# Patient Record
Sex: FEMALE | Race: WHITE | NOT HISPANIC OR LATINO | URBAN - METROPOLITAN AREA
[De-identification: names, ages, dates, MRNs, and addresses within clinical notes are randomized per-mention and may not be internally consistent; named-entity substitution may affect disease eponyms.]

---

## 2017-07-05 ENCOUNTER — OUTPATIENT (OUTPATIENT)
Dept: OUTPATIENT SERVICES | Facility: HOSPITAL | Age: 52
LOS: 1 days | End: 2017-07-05
Payer: COMMERCIAL

## 2017-07-05 ENCOUNTER — APPOINTMENT (OUTPATIENT)
Dept: ULTRASOUND IMAGING | Facility: CLINIC | Age: 52
End: 2017-07-05

## 2017-07-05 DIAGNOSIS — Z98.89 OTHER SPECIFIED POSTPROCEDURAL STATES: Chronic | ICD-10-CM

## 2017-07-05 DIAGNOSIS — Z00.8 ENCOUNTER FOR OTHER GENERAL EXAMINATION: ICD-10-CM

## 2017-07-05 PROCEDURE — 76536 US EXAM OF HEAD AND NECK: CPT | Mod: 26

## 2017-07-05 PROCEDURE — 76536 US EXAM OF HEAD AND NECK: CPT

## 2017-12-28 ENCOUNTER — TRANSCRIPTION ENCOUNTER (OUTPATIENT)
Age: 52
End: 2017-12-28

## 2018-03-20 ENCOUNTER — TRANSCRIPTION ENCOUNTER (OUTPATIENT)
Age: 53
End: 2018-03-20

## 2018-11-06 ENCOUNTER — RESULT REVIEW (OUTPATIENT)
Age: 53
End: 2018-11-06

## 2018-11-09 ENCOUNTER — RESULT REVIEW (OUTPATIENT)
Age: 53
End: 2018-11-09

## 2018-11-28 ENCOUNTER — OUTPATIENT (OUTPATIENT)
Dept: OUTPATIENT SERVICES | Facility: HOSPITAL | Age: 53
LOS: 1 days | Discharge: HOME | End: 2018-11-28
Payer: COMMERCIAL

## 2018-11-28 ENCOUNTER — RESULT REVIEW (OUTPATIENT)
Age: 53
End: 2018-11-28

## 2018-11-28 VITALS
OXYGEN SATURATION: 98 % | DIASTOLIC BLOOD PRESSURE: 68 MMHG | HEART RATE: 84 BPM | RESPIRATION RATE: 16 BRPM | TEMPERATURE: 98 F | SYSTOLIC BLOOD PRESSURE: 139 MMHG

## 2018-11-28 VITALS
TEMPERATURE: 98 F | HEART RATE: 81 BPM | DIASTOLIC BLOOD PRESSURE: 83 MMHG | WEIGHT: 293 LBS | RESPIRATION RATE: 16 BRPM | SYSTOLIC BLOOD PRESSURE: 151 MMHG | HEIGHT: 66 IN | OXYGEN SATURATION: 98 %

## 2018-11-28 DIAGNOSIS — Z98.89 OTHER SPECIFIED POSTPROCEDURAL STATES: Chronic | ICD-10-CM

## 2018-11-28 DIAGNOSIS — Z98.890 OTHER SPECIFIED POSTPROCEDURAL STATES: Chronic | ICD-10-CM

## 2018-11-28 PROCEDURE — 88307 TISSUE EXAM BY PATHOLOGIST: CPT | Mod: 26

## 2018-11-28 PROCEDURE — 88305 TISSUE EXAM BY PATHOLOGIST: CPT | Mod: 26

## 2018-11-28 PROCEDURE — 76098 X-RAY EXAM SURGICAL SPECIMEN: CPT | Mod: 26

## 2018-11-28 RX ORDER — ACETAMINOPHEN 500 MG
1000 TABLET ORAL ONCE
Qty: 0 | Refills: 0 | Status: DISCONTINUED | OUTPATIENT
Start: 2018-11-28 | End: 2018-11-28

## 2018-11-28 RX ORDER — SODIUM CHLORIDE 9 MG/ML
1000 INJECTION, SOLUTION INTRAVENOUS
Qty: 0 | Refills: 0 | Status: DISCONTINUED | OUTPATIENT
Start: 2018-11-28 | End: 2018-11-28

## 2018-11-28 RX ORDER — MEPERIDINE HYDROCHLORIDE 50 MG/ML
12.5 INJECTION INTRAMUSCULAR; INTRAVENOUS; SUBCUTANEOUS
Qty: 0 | Refills: 0 | Status: DISCONTINUED | OUTPATIENT
Start: 2018-11-28 | End: 2018-11-28

## 2018-11-28 RX ORDER — ONDANSETRON 8 MG/1
4 TABLET, FILM COATED ORAL ONCE
Qty: 0 | Refills: 0 | Status: DISCONTINUED | OUTPATIENT
Start: 2018-11-28 | End: 2018-11-28

## 2018-11-28 RX ORDER — FENTANYL CITRATE 50 UG/ML
50 INJECTION INTRAVENOUS
Qty: 0 | Refills: 0 | Status: DISCONTINUED | OUTPATIENT
Start: 2018-11-28 | End: 2018-11-28

## 2018-11-28 RX ORDER — LEVOTHYROXINE SODIUM 125 MCG
1 TABLET ORAL
Qty: 0 | Refills: 0 | COMMUNITY

## 2018-11-28 RX ORDER — OXYCODONE HYDROCHLORIDE 5 MG/1
5 TABLET ORAL ONCE
Qty: 0 | Refills: 0 | Status: DISCONTINUED | OUTPATIENT
Start: 2018-11-28 | End: 2018-11-28

## 2018-11-28 NOTE — ASU PATIENT PROFILE, ADULT - PSH
delivery NOS  3 c- sections 3/87, ,   H/O prior ablation treatment  left leg  S/P evacuation of subdural hematoma  13  S/P gastric surgery  Lap Band   S/P hysterectomy  11/17/10- FARHAN/BSO- polycystic ovaries  Thyroid cancer  Thyroidectomy  for thyroid cancer

## 2018-11-28 NOTE — ASU DISCHARGE PLAN (ADULT/PEDIATRIC). - MEDICATION SUMMARY - MEDICATIONS TO CHANGE
I will SWITCH the dose or number of times a day I take the medications listed below when I get home from the hospital:    Benicar HCT 12.5 mg-40 mg oral tablet  -- 1 tab(s) by mouth once a day    verapamil 180 mg/24 hours oral capsule, extended release  -- 1 cap(s) by mouth once a day    Synthroid 175 mcg (0.175 mg) oral tablet  -- 1 tab(s) by mouth once a day

## 2018-11-28 NOTE — ASU PATIENT PROFILE, ADULT - PMH
Breast cancer  left  Esophageal dysmotility    GERD (gastroesophageal reflux disease)    Hay fever    Hypertension    Obesity, Class II, BMI 35-39.9    Peripheral venous insufficiency  left leg  Polycystic ovary disease    Sleep apnea  uses c-pap  Subdural hematoma, nontraumatic  2/2013  Thyroid cancer  surgery Decembe 1992

## 2018-11-28 NOTE — BRIEF OPERATIVE NOTE - PROCEDURE
<<-----Click on this checkbox to enter Procedure Breast lumpectomy with sentinel node biopsy  11/28/2018    Active  [id number]

## 2018-11-28 NOTE — BRIEF OPERATIVE NOTE - PRE-OP DX
Malignant neoplasm of central portion of left breast in female, estrogen receptor positive  11/28/2018    Active  Mac White

## 2018-12-04 LAB — SURGICAL PATHOLOGY FINAL REPORT - CH: SIGNIFICANT CHANGE UP

## 2018-12-05 DIAGNOSIS — Z80.3 FAMILY HISTORY OF MALIGNANT NEOPLASM OF BREAST: ICD-10-CM

## 2018-12-05 DIAGNOSIS — N60.12 DIFFUSE CYSTIC MASTOPATHY OF LEFT BREAST: ICD-10-CM

## 2018-12-05 DIAGNOSIS — C50.912 MALIGNANT NEOPLASM OF UNSPECIFIED SITE OF LEFT FEMALE BREAST: ICD-10-CM

## 2018-12-05 DIAGNOSIS — E03.9 HYPOTHYROIDISM, UNSPECIFIED: ICD-10-CM

## 2018-12-05 DIAGNOSIS — C77.3 SECONDARY AND UNSPECIFIED MALIGNANT NEOPLASM OF AXILLA AND UPPER LIMB LYMPH NODES: ICD-10-CM

## 2018-12-05 DIAGNOSIS — I10 ESSENTIAL (PRIMARY) HYPERTENSION: ICD-10-CM

## 2018-12-05 DIAGNOSIS — N60.22 FIBROADENOSIS OF LEFT BREAST: ICD-10-CM

## 2018-12-06 PROBLEM — C50.919 MALIGNANT NEOPLASM OF UNSPECIFIED SITE OF UNSPECIFIED FEMALE BREAST: Chronic | Status: ACTIVE | Noted: 2018-11-28

## 2018-12-06 PROBLEM — I87.2 VENOUS INSUFFICIENCY (CHRONIC) (PERIPHERAL): Chronic | Status: ACTIVE | Noted: 2018-11-28

## 2018-12-10 ENCOUNTER — APPOINTMENT (OUTPATIENT)
Dept: PLASTIC SURGERY | Facility: CLINIC | Age: 53
End: 2018-12-10
Payer: COMMERCIAL

## 2018-12-10 VITALS — HEIGHT: 66 IN | WEIGHT: 290 LBS | BODY MASS INDEX: 46.61 KG/M2

## 2018-12-10 DIAGNOSIS — Z72.3 LACK OF PHYSICAL EXERCISE: ICD-10-CM

## 2018-12-10 DIAGNOSIS — Z85.850 PERSONAL HISTORY OF MALIGNANT NEOPLASM OF THYROID: ICD-10-CM

## 2018-12-10 DIAGNOSIS — Z78.9 OTHER SPECIFIED HEALTH STATUS: ICD-10-CM

## 2018-12-10 DIAGNOSIS — Z80.3 FAMILY HISTORY OF MALIGNANT NEOPLASM OF BREAST: ICD-10-CM

## 2018-12-10 PROCEDURE — 99205 OFFICE O/P NEW HI 60 MIN: CPT

## 2018-12-11 ENCOUNTER — TRANSCRIPTION ENCOUNTER (OUTPATIENT)
Age: 53
End: 2018-12-11

## 2018-12-12 ENCOUNTER — FORM ENCOUNTER (OUTPATIENT)
Age: 53
End: 2018-12-12

## 2018-12-12 ENCOUNTER — APPOINTMENT (OUTPATIENT)
Dept: CT IMAGING | Facility: CLINIC | Age: 53
End: 2018-12-12
Payer: COMMERCIAL

## 2018-12-13 ENCOUNTER — APPOINTMENT (OUTPATIENT)
Dept: CT IMAGING | Facility: CLINIC | Age: 53
End: 2018-12-13

## 2018-12-13 ENCOUNTER — OUTPATIENT (OUTPATIENT)
Dept: OUTPATIENT SERVICES | Facility: HOSPITAL | Age: 53
LOS: 1 days | End: 2018-12-13
Payer: COMMERCIAL

## 2018-12-13 DIAGNOSIS — Z00.8 ENCOUNTER FOR OTHER GENERAL EXAMINATION: ICD-10-CM

## 2018-12-13 DIAGNOSIS — Z98.89 OTHER SPECIFIED POSTPROCEDURAL STATES: Chronic | ICD-10-CM

## 2018-12-13 DIAGNOSIS — Z98.890 OTHER SPECIFIED POSTPROCEDURAL STATES: Chronic | ICD-10-CM

## 2018-12-13 PROCEDURE — 74174 CTA ABD&PLVS W/CONTRAST: CPT

## 2018-12-13 PROCEDURE — 82565 ASSAY OF CREATININE: CPT

## 2018-12-13 PROCEDURE — 74174 CTA ABD&PLVS W/CONTRAST: CPT | Mod: 26

## 2018-12-13 NOTE — H&P ADULT - NSHPPHYSICALEXAM_GEN_ALL_CORE
Vital Signs : BP        HR       RR                 Constitutional: well developed, well nourished, no deformities and no acute distress    Neurological: Alert & Oriented x 3, CRISTOBAL, no focal deficits    HEENT: NC/AT, PERRLA, EOMI,  Neck supple.    Respiratory: CTA B/L, No wheezing/crackles/rhonchi    Cardiovascular: (+) S1 & S2, RRR, No m/r/g    Gastrointestinal: soft, NT, nondistended, (+) BS    Genitourinary: non distended bladder, voiding freely    Extremities: No pedal edema, No clubbing, No cyanosis    Skin:  normal skin color and pigmentation, no skin lesions Constitutional: well developed, well nourished, no deformities and no acute distress    Neurological: Alert & Oriented x 3, CRISTOBAL, no focal deficits    HEENT: NC/AT, PERRLA, EOMI,  Neck supple.    Respiratory: CTA B/L, No wheezing/crackles/rhonchi    Cardiovascular: (+) S1 & S2, RRR, No m/r/g    Gastrointestinal: soft, NT, nondistended, (+) BS    Genitourinary: non distended bladder, voiding freely    Extremities: No pedal edema, No clubbing, No cyanosis    Skin:  normal skin color and pigmentation, no skin lesions

## 2018-12-13 NOTE — H&P ADULT - PROBLEM SELECTOR PLAN 1
Pt is referred for Lt heart cath/possible PCI. Labs & medications are reviewed. Informed consent obtained after discussion of [facility] risks, benefits and alternatives  with patient. Risk discussed included, but not limited to MI, stroke, mortality, major bleeding, arrhythmia, or infection.  An educational material provided. Pt. verbalizes understandings of pre-procedural instructions.

## 2018-12-13 NOTE — H&P ADULT - ASSESSMENT
52 yo F with PMHx of obesity, HTN, HLD, had stress test for  pre breast lumpectomy, which showed (+)AW ischemia.  Pt referred for LHC with possible intervention.

## 2018-12-13 NOTE — H&P ADULT - HISTORY OF PRESENT ILLNESS
54 yo F with PMHx of obesity, HTN, HLD, had stress test for  pre breast lumpectomy, which showed (+)AW ischemia.  Pt referred for LHC with possible intervention.

## 2018-12-13 NOTE — H&P ADULT - NSHPREVIEWOFSYSTEMS_GEN_ALL_CORE
General: Pt denies recent weight loss/fever/chills    Neurological: denies numbness or  sensation loss    Cardiovascular: denies chest pain/palpitations/leg edema    Respiratory and Thorax: denies SOB/cough/wheezing    Gastrointestinal: denies abdominal pain/diarrhea/constipation/bloody stool    Genitourinary: denies urinary frequency/urgency/ dysuria    Musculoskeletal: denies joint pain or swelling, denies restricted motion    Hematologic: denies abnormal bleeding

## 2018-12-13 NOTE — H&P ADULT - FAMILY HISTORY
Father  Still living? No  Family history of aortic aneurysm, Age at diagnosis: Age Unknown     Mother  Still living? Yes, Estimated age: 81-90  Family history of hypertension, Age at diagnosis: Age Unknown  Family history of macular degeneration, Age at diagnosis: Age Unknown     Sibling  Still living? Yes, Estimated age: 61-70  Family history of breast cancer in male, Age at diagnosis: Age Unknown

## 2018-12-14 ENCOUNTER — OUTPATIENT (OUTPATIENT)
Dept: OUTPATIENT SERVICES | Facility: HOSPITAL | Age: 53
LOS: 1 days | Discharge: HOME | End: 2018-12-14

## 2018-12-14 VITALS
DIASTOLIC BLOOD PRESSURE: 60 MMHG | OXYGEN SATURATION: 97 % | HEART RATE: 80 BPM | SYSTOLIC BLOOD PRESSURE: 120 MMHG | RESPIRATION RATE: 16 BRPM

## 2018-12-14 VITALS
HEIGHT: 66 IN | RESPIRATION RATE: 16 BRPM | WEIGHT: 289.91 LBS | OXYGEN SATURATION: 99 % | SYSTOLIC BLOOD PRESSURE: 140 MMHG | DIASTOLIC BLOOD PRESSURE: 93 MMHG | TEMPERATURE: 98 F | HEART RATE: 97 BPM

## 2018-12-14 DIAGNOSIS — R94.39 ABNORMAL RESULT OF OTHER CARDIOVASCULAR FUNCTION STUDY: ICD-10-CM

## 2018-12-14 DIAGNOSIS — Z98.890 OTHER SPECIFIED POSTPROCEDURAL STATES: Chronic | ICD-10-CM

## 2018-12-14 DIAGNOSIS — Z98.89 OTHER SPECIFIED POSTPROCEDURAL STATES: Chronic | ICD-10-CM

## 2018-12-14 LAB
ANION GAP SERPL CALC-SCNC: 7 MMOL/L — SIGNIFICANT CHANGE UP (ref 5–17)
BUN SERPL-MCNC: 13 MG/DL — SIGNIFICANT CHANGE UP (ref 7–23)
CALCIUM SERPL-MCNC: 9.1 MG/DL — SIGNIFICANT CHANGE UP (ref 8.5–10.1)
CHLORIDE SERPL-SCNC: 106 MMOL/L — SIGNIFICANT CHANGE UP (ref 96–108)
CO2 SERPL-SCNC: 28 MMOL/L — SIGNIFICANT CHANGE UP (ref 22–31)
CREAT SERPL-MCNC: 0.95 MG/DL — SIGNIFICANT CHANGE UP (ref 0.5–1.3)
GLUCOSE SERPL-MCNC: 119 MG/DL — HIGH (ref 70–99)
HCT VFR BLD CALC: 43.6 % — SIGNIFICANT CHANGE UP (ref 34.5–45)
HGB BLD-MCNC: 14 G/DL — SIGNIFICANT CHANGE UP (ref 11.5–15.5)
MCHC RBC-ENTMCNC: 26.2 PG — LOW (ref 27–34)
MCHC RBC-ENTMCNC: 32.1 GM/DL — SIGNIFICANT CHANGE UP (ref 32–36)
MCV RBC AUTO: 81.6 FL — SIGNIFICANT CHANGE UP (ref 80–100)
NRBC # BLD: 0 /100 WBCS — SIGNIFICANT CHANGE UP (ref 0–0)
PLATELET # BLD AUTO: 291 K/UL — SIGNIFICANT CHANGE UP (ref 150–400)
POTASSIUM SERPL-MCNC: 3.7 MMOL/L — SIGNIFICANT CHANGE UP (ref 3.5–5.3)
POTASSIUM SERPL-SCNC: 3.7 MMOL/L — SIGNIFICANT CHANGE UP (ref 3.5–5.3)
RBC # BLD: 5.34 M/UL — HIGH (ref 3.8–5.2)
RBC # FLD: 13.9 % — SIGNIFICANT CHANGE UP (ref 10.3–14.5)
SODIUM SERPL-SCNC: 141 MMOL/L — SIGNIFICANT CHANGE UP (ref 135–145)
WBC # BLD: 8.04 K/UL — SIGNIFICANT CHANGE UP (ref 3.8–10.5)
WBC # FLD AUTO: 8.04 K/UL — SIGNIFICANT CHANGE UP (ref 3.8–10.5)

## 2018-12-14 NOTE — PROGRESS NOTE ADULT - SUBJECTIVE AND OBJECTIVE BOX
HPI:  52 yo F with PMHx of obesity, HTN, HLD, had stress test for  pre breast lumpectomy, which showed (+)AW ischemia.  Pt referred for LHC with possible intervention. Now, pt is s/p LHC and revealed normal coronaries.     ROS: denies chest pain/ pressure, SOB or palpitation     Physical exam:   General: awake, no acute distress   HEENT: NCAT, neck supple   CV: RRR, normal S1S2, no murmur/ rub   Pulmonary: clear, no wheezing or rales   GI: +BS, soft, non-tender, non-distended   : voiding freely   Extremities no edema, + pedal pulses   Skin: no rashes or lesion. Rt. femoral access site with perclose; no hematoma or bleeding       #  s/p LHC today: normal coronaries.  - post procedure, outcome and follow up care reviewed with patient/MD  - continue current medications   - discharge home today   - follow up with cardiologist as needed

## 2018-12-17 DIAGNOSIS — I87.2 VENOUS INSUFFICIENCY (CHRONIC) (PERIPHERAL): ICD-10-CM

## 2018-12-17 DIAGNOSIS — E28.2 POLYCYSTIC OVARIAN SYNDROME: ICD-10-CM

## 2018-12-17 DIAGNOSIS — K21.9 GASTRO-ESOPHAGEAL REFLUX DISEASE WITHOUT ESOPHAGITIS: ICD-10-CM

## 2018-12-17 DIAGNOSIS — E78.5 HYPERLIPIDEMIA, UNSPECIFIED: ICD-10-CM

## 2018-12-17 DIAGNOSIS — Z85.850 PERSONAL HISTORY OF MALIGNANT NEOPLASM OF THYROID: ICD-10-CM

## 2018-12-17 DIAGNOSIS — Z85.3 PERSONAL HISTORY OF MALIGNANT NEOPLASM OF BREAST: ICD-10-CM

## 2018-12-17 DIAGNOSIS — R94.39 ABNORMAL RESULT OF OTHER CARDIOVASCULAR FUNCTION STUDY: ICD-10-CM

## 2018-12-17 DIAGNOSIS — E66.9 OBESITY, UNSPECIFIED: ICD-10-CM

## 2018-12-17 DIAGNOSIS — G47.30 SLEEP APNEA, UNSPECIFIED: ICD-10-CM

## 2018-12-17 DIAGNOSIS — Z82.49 FAMILY HISTORY OF ISCHEMIC HEART DISEASE AND OTHER DISEASES OF THE CIRCULATORY SYSTEM: ICD-10-CM

## 2018-12-17 DIAGNOSIS — I20.8 OTHER FORMS OF ANGINA PECTORIS: ICD-10-CM

## 2018-12-17 DIAGNOSIS — Z98.84 BARIATRIC SURGERY STATUS: ICD-10-CM

## 2018-12-17 DIAGNOSIS — I10 ESSENTIAL (PRIMARY) HYPERTENSION: ICD-10-CM

## 2018-12-17 DIAGNOSIS — Z86.73 PERSONAL HISTORY OF TRANSIENT ISCHEMIC ATTACK (TIA), AND CEREBRAL INFARCTION WITHOUT RESIDUAL DEFICITS: ICD-10-CM

## 2018-12-18 ENCOUNTER — OUTPATIENT (OUTPATIENT)
Dept: OUTPATIENT SERVICES | Facility: HOSPITAL | Age: 53
LOS: 1 days | Discharge: HOME | End: 2018-12-18
Payer: COMMERCIAL

## 2018-12-18 ENCOUNTER — RX RENEWAL (OUTPATIENT)
Age: 53
End: 2018-12-18

## 2018-12-18 DIAGNOSIS — Z90.89 ACQUIRED ABSENCE OF OTHER ORGANS: Chronic | ICD-10-CM

## 2018-12-18 DIAGNOSIS — Z98.89 OTHER SPECIFIED POSTPROCEDURAL STATES: Chronic | ICD-10-CM

## 2018-12-18 DIAGNOSIS — Z98.890 OTHER SPECIFIED POSTPROCEDURAL STATES: Chronic | ICD-10-CM

## 2018-12-18 DIAGNOSIS — C50.919 MALIGNANT NEOPLASM OF UNSPECIFIED SITE OF UNSPECIFIED FEMALE BREAST: ICD-10-CM

## 2018-12-18 LAB
ANION GAP SERPL CALC-SCNC: 6 MMOL/L — SIGNIFICANT CHANGE UP (ref 5–17)
APPEARANCE UR: CLEAR — SIGNIFICANT CHANGE UP
BACTERIA # UR AUTO: ABNORMAL
BASOPHILS # BLD AUTO: 0.11 K/UL — SIGNIFICANT CHANGE UP (ref 0–0.2)
BASOPHILS NFR BLD AUTO: 1.4 % — SIGNIFICANT CHANGE UP (ref 0–2)
BILIRUB UR-MCNC: NEGATIVE — SIGNIFICANT CHANGE UP
BLD GP AB SCN SERPL QL: SIGNIFICANT CHANGE UP
BUN SERPL-MCNC: 17 MG/DL — SIGNIFICANT CHANGE UP (ref 7–23)
CALCIUM SERPL-MCNC: 9 MG/DL — SIGNIFICANT CHANGE UP (ref 8.5–10.1)
CHLORIDE SERPL-SCNC: 106 MMOL/L — SIGNIFICANT CHANGE UP (ref 96–108)
CO2 SERPL-SCNC: 29 MMOL/L — SIGNIFICANT CHANGE UP (ref 22–31)
COLOR SPEC: YELLOW — SIGNIFICANT CHANGE UP
CREAT SERPL-MCNC: 0.94 MG/DL — SIGNIFICANT CHANGE UP (ref 0.5–1.3)
DIFF PNL FLD: NEGATIVE — SIGNIFICANT CHANGE UP
EOSINOPHIL # BLD AUTO: 0.22 K/UL — SIGNIFICANT CHANGE UP (ref 0–0.5)
EOSINOPHIL NFR BLD AUTO: 2.9 % — SIGNIFICANT CHANGE UP (ref 0–6)
EPI CELLS # UR: SIGNIFICANT CHANGE UP
GLUCOSE SERPL-MCNC: 108 MG/DL — HIGH (ref 70–99)
GLUCOSE UR QL: NEGATIVE MG/DL — SIGNIFICANT CHANGE UP
HCT VFR BLD CALC: 43.5 % — SIGNIFICANT CHANGE UP (ref 34.5–45)
HGB BLD-MCNC: 14.1 G/DL — SIGNIFICANT CHANGE UP (ref 11.5–15.5)
IMM GRANULOCYTES NFR BLD AUTO: 0.3 % — SIGNIFICANT CHANGE UP (ref 0–1.5)
KETONES UR-MCNC: NEGATIVE — SIGNIFICANT CHANGE UP
LEUKOCYTE ESTERASE UR-ACNC: ABNORMAL
LYMPHOCYTES # BLD AUTO: 2.6 K/UL — SIGNIFICANT CHANGE UP (ref 1–3.3)
LYMPHOCYTES # BLD AUTO: 34.2 % — SIGNIFICANT CHANGE UP (ref 13–44)
MCHC RBC-ENTMCNC: 26.9 PG — LOW (ref 27–34)
MCHC RBC-ENTMCNC: 32.4 GM/DL — SIGNIFICANT CHANGE UP (ref 32–36)
MCV RBC AUTO: 82.9 FL — SIGNIFICANT CHANGE UP (ref 80–100)
MONOCYTES # BLD AUTO: 0.76 K/UL — SIGNIFICANT CHANGE UP (ref 0–0.9)
MONOCYTES NFR BLD AUTO: 10 % — SIGNIFICANT CHANGE UP (ref 2–14)
NEUTROPHILS # BLD AUTO: 3.9 K/UL — SIGNIFICANT CHANGE UP (ref 1.8–7.4)
NEUTROPHILS NFR BLD AUTO: 51.2 % — SIGNIFICANT CHANGE UP (ref 43–77)
NITRITE UR-MCNC: NEGATIVE — SIGNIFICANT CHANGE UP
NRBC # BLD: 0 /100 WBCS — SIGNIFICANT CHANGE UP (ref 0–0)
PH UR: 6.5 — SIGNIFICANT CHANGE UP (ref 5–8)
PLATELET # BLD AUTO: 285 K/UL — SIGNIFICANT CHANGE UP (ref 150–400)
POTASSIUM SERPL-MCNC: 3.9 MMOL/L — SIGNIFICANT CHANGE UP (ref 3.5–5.3)
POTASSIUM SERPL-SCNC: 3.9 MMOL/L — SIGNIFICANT CHANGE UP (ref 3.5–5.3)
PROT UR-MCNC: NEGATIVE MG/DL — SIGNIFICANT CHANGE UP
RBC # BLD: 5.25 M/UL — HIGH (ref 3.8–5.2)
RBC # FLD: 13.8 % — SIGNIFICANT CHANGE UP (ref 10.3–14.5)
RBC CASTS # UR COMP ASSIST: NEGATIVE /HPF — SIGNIFICANT CHANGE UP (ref 0–4)
SODIUM SERPL-SCNC: 141 MMOL/L — SIGNIFICANT CHANGE UP (ref 135–145)
SP GR SPEC: 1.01 — SIGNIFICANT CHANGE UP (ref 1.01–1.02)
TYPE + AB SCN PNL BLD: SIGNIFICANT CHANGE UP
UROBILINOGEN FLD QL: NEGATIVE MG/DL — SIGNIFICANT CHANGE UP
WBC # BLD: 7.61 K/UL — SIGNIFICANT CHANGE UP (ref 3.8–10.5)
WBC # FLD AUTO: 7.61 K/UL — SIGNIFICANT CHANGE UP (ref 3.8–10.5)
WBC UR QL: SIGNIFICANT CHANGE UP

## 2018-12-18 PROCEDURE — 71046 X-RAY EXAM CHEST 2 VIEWS: CPT | Mod: 26

## 2018-12-18 PROCEDURE — 93010 ELECTROCARDIOGRAM REPORT: CPT

## 2018-12-18 RX ORDER — LEVOTHYROXINE SODIUM 125 MCG
1 TABLET ORAL
Qty: 0 | Refills: 0 | COMMUNITY

## 2018-12-18 NOTE — H&P PST ADULT - PSH
delivery NOS  3 c- sections 3/87, ,   H/O prior ablation treatment  left leg  S/P evacuation of subdural hematoma  13 craniotomy  S/P gastric surgery  Lap Band  removal of lap band   S/P hysterectomy  11/17/10- FARHAN/BSO- polycystic ovaries  Thyroid cancer  Thyroidectomy  for thyroid cancer

## 2018-12-18 NOTE — CHART NOTE - NSCHARTNOTEFT_GEN_A_CORE
T F  BP  HR  RR  SpO2  % RA      Plan:  1. Pre-surgical instructions given: NPO post midnight  2. Patient Instructed to take the following medications with sips of water: synthroid verapamil   3.  Labs EKG CXR done as per surgeon request  4. Medical Evaluation with Dr Martin   5. EZ wash instructions given      CAPRINI SCORE [CLOT]    AGE RELATED RISK FACTORS                                                       MOBILITY RELATED FACTORS  [ ] Age 41-60 years                                            (1 Point)                  [ ] Bed rest                                                        (1 Point)  [ ] Age: 61-74 years                                           (2 Points)                 [ ] Plaster cast                                                   (2 Points)  [ ] Age= 75 years                                              (3 Points)                 [ ] Bed bound for more than 72 hours                 (2 Points)    DISEASE RELATED RISK FACTORS                                               GENDER SPECIFIC FACTORS  [ ] Edema in the lower extremities                       (1 Point)                  [ ] Pregnancy                                                     (1 Point)  [ ] Varicose veins                                               (1 Point)                  [ ] Post-partum < 6 weeks                                   (1 Point)             [ ] BMI > 25 Kg/m2                                            (1 Point)                  [ ] Hormonal therapy  or oral contraception          (1 Point)                 [ ] Sepsis (in the previous month)                        (1 Point)                  [ ] History of pregnancy complications                 (1 point)  [ ] Pneumonia or serious lung disease                                               [ ] Unexplained or recurrent                     (1 Point)           (in the previous month)                               (1 Point)  [ ] Abnormal pulmonary function test                     (1 Point)                 SURGERY RELATED RISK FACTORS  [ ] Acute myocardial infarction                              (1 Point)                 [ ]  Section                                             (1 Point)  [ ] Congestive heart failure (in the previous month)  (1 Point)               [ ] Minor surgery                                                  (1 Point)   [ ] Inflammatory bowel disease                             (1 Point)                 [ ] Arthroscopic surgery                                        (2 Points)  [ ] Central venous access                                      (2 Points)                [ ] General surgery lasting more than 45 minutes   (2 Points)       [ ] Stroke (in the previous month)                          (5 Points)               [ ] Elective arthroplasty                                         (5 Points)            ( ) past and present malignancy                             ( 2 points)                                                                                                                                    HEMATOLOGY RELATED FACTORS                                                 TRAUMA RELATED RISK FACTORS  [ ] Prior episodes of VTE                                     (3 Points)                 [ ] Fracture of the hip, pelvis, or leg                       (5 Points)  [ ] Positive family history for VTE                         (3 Points)                 [ ] Acute spinal cord injury (in the previous month)  (5 Points)  [ ] Prothrombin 06452 A                                     (3 Points)                 [ ] Paralysis  (less than 1 month)                             (5 Points)  [ ] Factor V Leiden                                             (3 Points)                  [ ] Multiple Trauma within 1 month                        (5 Points)  [ ] Lupus anticoagulants                                     (3 Points)                                                           [ ] Anticardiolipin antibodies                               (3 Points)                                                       [ ] High homocysteine in the blood                      (3 Points)                                             [ ] Other congenital or acquired thrombophilia      (3 Points)                                                [ ] Heparin induced thrombocytopenia                  (3 Points)                                          Total Score [          ] T 98.1 F  /91  pt anxious  HR 73  RR 16   SpO2 98  % RA    53 year old female presents to PST for bilateral mastectomy with left axillary lymph node dissection with Dr Dixon and YOAV flap with Dr Monet   Plan:  1. Pre-surgical instructions given: NPO post midnight  2. Patient Instructed to take the following medications with sips of water: synthroid verapamil   3.  Labs EKG CXR done as per surgeon request  4. Medical Evaluation with Dr Martin   5. EZ wash instructions given      CAPRINI SCORE [CLOT]    AGE RELATED RISK FACTORS                                                       MOBILITY RELATED FACTORS  [x ] Age 41-60 years                                            (1 Point)                  [ ] Bed rest                                                        (1 Point)  [ ] Age: 61-74 years                                           (2 Points)                 [ ] Plaster cast                                                   (2 Points)  [ ] Age= 75 years                                              (3 Points)                 [ ] Bed bound for more than 72 hours                 (2 Points)    DISEASE RELATED RISK FACTORS                                               GENDER SPECIFIC FACTORS  [ ] Edema in the lower extremities                       (1 Point)                  [ ] Pregnancy                                                     (1 Point)  [ ] Varicose veins                                               (1 Point)                  [ ] Post-partum < 6 weeks                                   (1 Point)             [ x] BMI > 25 Kg/m2                                            (1 Point)                  [ ] Hormonal therapy  or oral contraception          (1 Point)                 [ ] Sepsis (in the previous month)                        (1 Point)                  [ ] History of pregnancy complications                 (1 point)  [ ] Pneumonia or serious lung disease                                               [ ] Unexplained or recurrent                     (1 Point)           (in the previous month)                               (1 Point)  [ ] Abnormal pulmonary function test                     (1 Point)                 SURGERY RELATED RISK FACTORS  [ ] Acute myocardial infarction                              (1 Point)                 [ ]  Section                                             (1 Point)  [ ] Congestive heart failure (in the previous month)  (1 Point)               [ ] Minor surgery                                                  (1 Point)   [ ] Inflammatory bowel disease                             (1 Point)                 [ ] Arthroscopic surgery                                        (2 Points)  [ ] Central venous access                                      (2 Points)                [x ] General surgery lasting more than 45 minutes   (2 Points)       [ ] Stroke (in the previous month)                          (5 Points)               [ ] Elective arthroplasty                                         (5 Points)            ( x) past and present malignancy                             ( 2 points)                                                                                                                                    HEMATOLOGY RELATED FACTORS                                                 TRAUMA RELATED RISK FACTORS  [ ] Prior episodes of VTE                                     (3 Points)                 [ ] Fracture of the hip, pelvis, or leg                       (5 Points)  [ ] Positive family history for VTE                         (3 Points)                 [ ] Acute spinal cord injury (in the previous month)  (5 Points)  [ ] Prothrombin 22530 A                                     (3 Points)                 [ ] Paralysis  (less than 1 month)                             (5 Points)  [ ] Factor V Leiden                                             (3 Points)                  [ ] Multiple Trauma within 1 month                        (5 Points)  [ ] Lupus anticoagulants                                     (3 Points)                                                           [ ] Anticardiolipin antibodies                               (3 Points)                                                       [ ] High homocysteine in the blood                      (3 Points)                                             [ ] Other congenital or acquired thrombophilia      (3 Points)                                                [ ] Heparin induced thrombocytopenia                  (3 Points)                                          Total Score [    6      ]      The Caprini score indicates that this patient is at high risk for a VTE event (score => 6).    Surgical patients in this group will benefit from both pharmacologic prophylaxis and intermittent compression devices.    The surgical team will determine the balance between VTE risk and bleeding risk, and other clinical considerations.

## 2018-12-18 NOTE — H&P PST ADULT - PMH
Breast cancer  left  Esophageal dysmotility  due to to lap band  GERD (gastroesophageal reflux disease)    Hay fever    Hypertension    Obesity, Class II, BMI 35-39.9    Peripheral venous insufficiency  left leg  Polycystic ovary disease    Sleep apnea  no cpap  Subdural hematoma, nontraumatic  2/2013  Thyroid cancer  surgery Decembe 1992

## 2018-12-19 ENCOUNTER — RESULT REVIEW (OUTPATIENT)
Age: 53
End: 2018-12-19

## 2018-12-19 ENCOUNTER — INPATIENT (INPATIENT)
Facility: HOSPITAL | Age: 53
LOS: 1 days | Discharge: HOME | End: 2018-12-21
Attending: SURGERY | Admitting: SURGERY
Payer: COMMERCIAL

## 2018-12-19 VITALS
WEIGHT: 293 LBS | DIASTOLIC BLOOD PRESSURE: 57 MMHG | SYSTOLIC BLOOD PRESSURE: 139 MMHG | OXYGEN SATURATION: 97 % | TEMPERATURE: 98 F | HEIGHT: 66 IN | RESPIRATION RATE: 16 BRPM | HEART RATE: 86 BPM

## 2018-12-19 DIAGNOSIS — Z98.89 OTHER SPECIFIED POSTPROCEDURAL STATES: Chronic | ICD-10-CM

## 2018-12-19 DIAGNOSIS — Z98.890 OTHER SPECIFIED POSTPROCEDURAL STATES: Chronic | ICD-10-CM

## 2018-12-19 DIAGNOSIS — Z90.89 ACQUIRED ABSENCE OF OTHER ORGANS: Chronic | ICD-10-CM

## 2018-12-19 PROCEDURE — 88305 TISSUE EXAM BY PATHOLOGIST: CPT | Mod: 26

## 2018-12-19 PROCEDURE — 15877 SUCTION LIPECTOMY TRUNK: CPT

## 2018-12-19 PROCEDURE — 88307 TISSUE EXAM BY PATHOLOGIST: CPT | Mod: 26

## 2018-12-19 PROCEDURE — 38530 BIOPSY/REMOVAL LYMPH NODES: CPT | Mod: LT

## 2018-12-19 PROCEDURE — 38530 BIOPSY/REMOVAL LYMPH NODES: CPT | Mod: RT

## 2018-12-19 PROCEDURE — S2068: CPT | Mod: 62,LT

## 2018-12-19 PROCEDURE — 38999 UNLISTD PX HEMIC/LYMPHTC SYS: CPT

## 2018-12-19 PROCEDURE — 88360 TUMOR IMMUNOHISTOCHEM/MANUAL: CPT | Mod: 26

## 2018-12-19 PROCEDURE — 19307 MAST MOD RAD: CPT | Mod: AS,LT

## 2018-12-19 PROCEDURE — S2068: CPT | Mod: RT,62

## 2018-12-19 PROCEDURE — 19303 MAST SIMPLE COMPLETE: CPT | Mod: AS,LT,XE

## 2018-12-19 PROCEDURE — 88342 IMHCHEM/IMCYTCHM 1ST ANTB: CPT | Mod: 26,59

## 2018-12-19 RX ORDER — INFLUENZA VIRUS VACCINE 15; 15; 15; 15 UG/.5ML; UG/.5ML; UG/.5ML; UG/.5ML
0.5 SUSPENSION INTRAMUSCULAR ONCE
Qty: 0 | Refills: 0 | Status: COMPLETED | OUTPATIENT
Start: 2018-12-19 | End: 2018-12-19

## 2018-12-19 RX ORDER — SODIUM CHLORIDE 9 MG/ML
1000 INJECTION, SOLUTION INTRAVENOUS
Qty: 0 | Refills: 0 | Status: DISCONTINUED | OUTPATIENT
Start: 2018-12-19 | End: 2018-12-20

## 2018-12-19 RX ORDER — ACETAMINOPHEN 500 MG
975 TABLET ORAL ONCE
Qty: 0 | Refills: 0 | Status: COMPLETED | OUTPATIENT
Start: 2018-12-19 | End: 2018-12-19

## 2018-12-19 RX ORDER — OXYCODONE HYDROCHLORIDE 5 MG/1
5 TABLET ORAL EVERY 6 HOURS
Qty: 0 | Refills: 0 | Status: DISCONTINUED | OUTPATIENT
Start: 2018-12-19 | End: 2018-12-21

## 2018-12-19 RX ORDER — LEVOTHYROXINE SODIUM 125 MCG
150 TABLET ORAL DAILY
Qty: 0 | Refills: 0 | Status: DISCONTINUED | OUTPATIENT
Start: 2018-12-19 | End: 2018-12-21

## 2018-12-19 RX ORDER — KETOROLAC TROMETHAMINE 30 MG/ML
15 SYRINGE (ML) INJECTION EVERY 6 HOURS
Qty: 0 | Refills: 0 | Status: DISCONTINUED | OUTPATIENT
Start: 2018-12-19 | End: 2018-12-21

## 2018-12-19 RX ORDER — ACETAMINOPHEN 500 MG
1000 TABLET ORAL ONCE
Qty: 0 | Refills: 0 | Status: COMPLETED | OUTPATIENT
Start: 2018-12-20 | End: 2018-12-20

## 2018-12-19 RX ORDER — VERAPAMIL HCL 240 MG
180 CAPSULE, EXTENDED RELEASE PELLETS 24 HR ORAL DAILY
Qty: 0 | Refills: 0 | Status: DISCONTINUED | OUTPATIENT
Start: 2018-12-19 | End: 2018-12-21

## 2018-12-19 RX ORDER — ONDANSETRON 8 MG/1
4 TABLET, FILM COATED ORAL EVERY 6 HOURS
Qty: 0 | Refills: 0 | Status: DISCONTINUED | OUTPATIENT
Start: 2018-12-19 | End: 2018-12-21

## 2018-12-19 RX ORDER — CEFAZOLIN SODIUM 1 G
2000 VIAL (EA) INJECTION EVERY 8 HOURS
Qty: 0 | Refills: 0 | Status: COMPLETED | OUTPATIENT
Start: 2018-12-19 | End: 2018-12-20

## 2018-12-19 RX ORDER — ACETAMINOPHEN 500 MG
1000 TABLET ORAL ONCE
Qty: 0 | Refills: 0 | Status: COMPLETED | OUTPATIENT
Start: 2018-12-19 | End: 2018-12-20

## 2018-12-19 RX ORDER — PHENTERMINE AND TOPIRAMATE 3.75; 23 MG/1; MG/1
1 CAPSULE, EXTENDED RELEASE ORAL
Qty: 0 | Refills: 0 | COMMUNITY

## 2018-12-19 RX ORDER — OXYCODONE HYDROCHLORIDE 5 MG/1
10 TABLET ORAL ONCE
Qty: 0 | Refills: 0 | Status: DISCONTINUED | OUTPATIENT
Start: 2018-12-19 | End: 2018-12-19

## 2018-12-19 RX ORDER — SODIUM CHLORIDE 9 MG/ML
3 INJECTION INTRAMUSCULAR; INTRAVENOUS; SUBCUTANEOUS EVERY 8 HOURS
Qty: 0 | Refills: 0 | Status: DISCONTINUED | OUTPATIENT
Start: 2018-12-19 | End: 2018-12-21

## 2018-12-19 RX ORDER — ONDANSETRON 8 MG/1
4 TABLET, FILM COATED ORAL ONCE
Qty: 0 | Refills: 0 | Status: DISCONTINUED | OUTPATIENT
Start: 2018-12-19 | End: 2018-12-20

## 2018-12-19 RX ORDER — SODIUM CHLORIDE 9 MG/ML
1000 INJECTION INTRAMUSCULAR; INTRAVENOUS; SUBCUTANEOUS
Qty: 0 | Refills: 0 | Status: DISCONTINUED | OUTPATIENT
Start: 2018-12-19 | End: 2018-12-20

## 2018-12-19 RX ORDER — LOSARTAN POTASSIUM 100 MG/1
100 TABLET, FILM COATED ORAL DAILY
Qty: 0 | Refills: 0 | Status: DISCONTINUED | OUTPATIENT
Start: 2018-12-19 | End: 2018-12-21

## 2018-12-19 RX ORDER — FENTANYL CITRATE 50 UG/ML
50 INJECTION INTRAVENOUS
Qty: 0 | Refills: 0 | Status: DISCONTINUED | OUTPATIENT
Start: 2018-12-19 | End: 2018-12-20

## 2018-12-19 RX ORDER — HEPARIN SODIUM 5000 [USP'U]/ML
5000 INJECTION INTRAVENOUS; SUBCUTANEOUS EVERY 12 HOURS
Qty: 0 | Refills: 0 | Status: DISCONTINUED | OUTPATIENT
Start: 2018-12-19 | End: 2018-12-21

## 2018-12-19 RX ADMIN — SODIUM CHLORIDE 3 MILLILITER(S): 9 INJECTION INTRAMUSCULAR; INTRAVENOUS; SUBCUTANEOUS at 22:00

## 2018-12-19 RX ADMIN — FENTANYL CITRATE 50 MICROGRAM(S): 50 INJECTION INTRAVENOUS at 22:27

## 2018-12-19 RX ADMIN — Medication 975 MILLIGRAM(S): at 08:17

## 2018-12-19 RX ADMIN — OXYCODONE HYDROCHLORIDE 10 MILLIGRAM(S): 5 TABLET ORAL at 08:17

## 2018-12-19 RX ADMIN — OXYCODONE HYDROCHLORIDE 10 MILLIGRAM(S): 5 TABLET ORAL at 08:18

## 2018-12-19 RX ADMIN — SODIUM CHLORIDE 125 MILLILITER(S): 9 INJECTION, SOLUTION INTRAVENOUS at 22:24

## 2018-12-19 RX ADMIN — Medication 100 MILLIGRAM(S): at 22:23

## 2018-12-19 RX ADMIN — FENTANYL CITRATE 50 MICROGRAM(S): 50 INJECTION INTRAVENOUS at 23:00

## 2018-12-20 LAB
ANION GAP SERPL CALC-SCNC: 6 MMOL/L — SIGNIFICANT CHANGE UP (ref 5–17)
BUN SERPL-MCNC: 12 MG/DL — SIGNIFICANT CHANGE UP (ref 7–23)
CALCIUM SERPL-MCNC: 8.2 MG/DL — LOW (ref 8.5–10.1)
CHLORIDE SERPL-SCNC: 109 MMOL/L — HIGH (ref 96–108)
CO2 SERPL-SCNC: 27 MMOL/L — SIGNIFICANT CHANGE UP (ref 22–31)
CREAT SERPL-MCNC: 0.77 MG/DL — SIGNIFICANT CHANGE UP (ref 0.5–1.3)
GLUCOSE SERPL-MCNC: 122 MG/DL — HIGH (ref 70–99)
HCT VFR BLD CALC: 35.2 % — SIGNIFICANT CHANGE UP (ref 34.5–45)
HGB BLD-MCNC: 11.1 G/DL — LOW (ref 11.5–15.5)
MCHC RBC-ENTMCNC: 26.5 PG — LOW (ref 27–34)
MCHC RBC-ENTMCNC: 31.5 GM/DL — LOW (ref 32–36)
MCV RBC AUTO: 84 FL — SIGNIFICANT CHANGE UP (ref 80–100)
NRBC # BLD: 0 /100 WBCS — SIGNIFICANT CHANGE UP (ref 0–0)
PLATELET # BLD AUTO: 278 K/UL — SIGNIFICANT CHANGE UP (ref 150–400)
POTASSIUM SERPL-MCNC: 4.2 MMOL/L — SIGNIFICANT CHANGE UP (ref 3.5–5.3)
POTASSIUM SERPL-SCNC: 4.2 MMOL/L — SIGNIFICANT CHANGE UP (ref 3.5–5.3)
RBC # BLD: 4.19 M/UL — SIGNIFICANT CHANGE UP (ref 3.8–5.2)
RBC # FLD: 14.4 % — SIGNIFICANT CHANGE UP (ref 10.3–14.5)
SODIUM SERPL-SCNC: 142 MMOL/L — SIGNIFICANT CHANGE UP (ref 135–145)
WBC # BLD: 12.95 K/UL — HIGH (ref 3.8–10.5)
WBC # FLD AUTO: 12.95 K/UL — HIGH (ref 3.8–10.5)

## 2018-12-20 RX ORDER — ACETAMINOPHEN 500 MG
650 TABLET ORAL EVERY 6 HOURS
Qty: 0 | Refills: 0 | Status: DISCONTINUED | OUTPATIENT
Start: 2018-12-20 | End: 2018-12-21

## 2018-12-20 RX ADMIN — Medication 15 MILLIGRAM(S): at 01:12

## 2018-12-20 RX ADMIN — Medication 1000 MILLIGRAM(S): at 01:12

## 2018-12-20 RX ADMIN — Medication 100 MILLIGRAM(S): at 06:03

## 2018-12-20 RX ADMIN — Medication 400 MILLIGRAM(S): at 00:42

## 2018-12-20 RX ADMIN — HEPARIN SODIUM 5000 UNIT(S): 5000 INJECTION INTRAVENOUS; SUBCUTANEOUS at 17:25

## 2018-12-20 RX ADMIN — Medication 650 MILLIGRAM(S): at 18:00

## 2018-12-20 RX ADMIN — Medication 15 MILLIGRAM(S): at 17:28

## 2018-12-20 RX ADMIN — Medication 15 MILLIGRAM(S): at 18:00

## 2018-12-20 RX ADMIN — FENTANYL CITRATE 50 MICROGRAM(S): 50 INJECTION INTRAVENOUS at 02:30

## 2018-12-20 RX ADMIN — Medication 15 MILLIGRAM(S): at 06:03

## 2018-12-20 RX ADMIN — Medication 15 MILLIGRAM(S): at 12:35

## 2018-12-20 RX ADMIN — HEPARIN SODIUM 5000 UNIT(S): 5000 INJECTION INTRAVENOUS; SUBCUTANEOUS at 06:04

## 2018-12-20 RX ADMIN — SODIUM CHLORIDE 3 MILLILITER(S): 9 INJECTION INTRAMUSCULAR; INTRAVENOUS; SUBCUTANEOUS at 13:12

## 2018-12-20 RX ADMIN — Medication 650 MILLIGRAM(S): at 17:27

## 2018-12-20 RX ADMIN — Medication 150 MICROGRAM(S): at 10:19

## 2018-12-20 RX ADMIN — Medication 400 MILLIGRAM(S): at 09:30

## 2018-12-20 RX ADMIN — LOSARTAN POTASSIUM 100 MILLIGRAM(S): 100 TABLET, FILM COATED ORAL at 17:36

## 2018-12-20 RX ADMIN — Medication 15 MILLIGRAM(S): at 00:43

## 2018-12-20 RX ADMIN — FENTANYL CITRATE 50 MICROGRAM(S): 50 INJECTION INTRAVENOUS at 02:02

## 2018-12-20 RX ADMIN — Medication 180 MILLIGRAM(S): at 17:37

## 2018-12-20 RX ADMIN — SODIUM CHLORIDE 3 MILLILITER(S): 9 INJECTION INTRAMUSCULAR; INTRAVENOUS; SUBCUTANEOUS at 06:02

## 2018-12-20 NOTE — PROGRESS NOTE ADULT - SUBJECTIVE AND OBJECTIVE BOX
pod 1  comfortable  lungs clear  flaps viable   abdomen benign  ext 1 edema    stable exam  s/p bilateral mastectomy with YOAV reconstruction

## 2018-12-21 ENCOUNTER — TRANSCRIPTION ENCOUNTER (OUTPATIENT)
Age: 53
End: 2018-12-21

## 2018-12-21 VITALS — SYSTOLIC BLOOD PRESSURE: 109 MMHG | HEART RATE: 85 BPM | DIASTOLIC BLOOD PRESSURE: 54 MMHG

## 2018-12-21 PROBLEM — E88.81 METABOLIC SYNDROME AND OTHER INSULIN RESISTANCE: Chronic | Status: ACTIVE | Noted: 2018-12-18

## 2018-12-21 PROBLEM — Z86.39 PERSONAL HISTORY OF OTHER ENDOCRINE, NUTRITIONAL AND METABOLIC DISEASE: Chronic | Status: ACTIVE | Noted: 2018-12-18

## 2018-12-21 RX ADMIN — Medication 15 MILLIGRAM(S): at 07:46

## 2018-12-21 RX ADMIN — Medication 15 MILLIGRAM(S): at 11:22

## 2018-12-21 RX ADMIN — Medication 650 MILLIGRAM(S): at 00:40

## 2018-12-21 RX ADMIN — Medication 650 MILLIGRAM(S): at 07:46

## 2018-12-21 RX ADMIN — Medication 650 MILLIGRAM(S): at 11:22

## 2018-12-21 RX ADMIN — Medication 650 MILLIGRAM(S): at 05:05

## 2018-12-21 RX ADMIN — Medication 650 MILLIGRAM(S): at 01:20

## 2018-12-21 RX ADMIN — Medication 15 MILLIGRAM(S): at 12:25

## 2018-12-21 RX ADMIN — Medication 150 MICROGRAM(S): at 05:04

## 2018-12-21 RX ADMIN — SODIUM CHLORIDE 3 MILLILITER(S): 9 INJECTION INTRAMUSCULAR; INTRAVENOUS; SUBCUTANEOUS at 00:41

## 2018-12-21 RX ADMIN — Medication 180 MILLIGRAM(S): at 05:04

## 2018-12-21 RX ADMIN — SODIUM CHLORIDE 3 MILLILITER(S): 9 INJECTION INTRAMUSCULAR; INTRAVENOUS; SUBCUTANEOUS at 05:05

## 2018-12-21 RX ADMIN — Medication 15 MILLIGRAM(S): at 05:04

## 2018-12-21 RX ADMIN — Medication 650 MILLIGRAM(S): at 12:25

## 2018-12-21 RX ADMIN — LOSARTAN POTASSIUM 100 MILLIGRAM(S): 100 TABLET, FILM COATED ORAL at 05:04

## 2018-12-21 RX ADMIN — HEPARIN SODIUM 5000 UNIT(S): 5000 INJECTION INTRAVENOUS; SUBCUTANEOUS at 05:05

## 2018-12-21 RX ADMIN — Medication 15 MILLIGRAM(S): at 00:41

## 2018-12-21 RX ADMIN — Medication 15 MILLIGRAM(S): at 01:00

## 2018-12-21 NOTE — PROGRESS NOTE ADULT - SUBJECTIVE AND OBJECTIVE BOX
Without complaints. Ambulated, tolerating po, voiding, pain controlled.  vss/a  JPs mod serosang output  flaps viable  mastectomy skin viable with slight ecchymosis  abdomen cdi  umbilicus viable    Doing well  Reviewed home instructions, JPs, bathing, sleeping, activity restrictions, meds and f/u.    Patient to call dr. greco's office today to confirm f/u appt for next week.

## 2018-12-21 NOTE — DISCHARGE NOTE ADULT - PLAN OF CARE
wound healing, drain care, recovery s/o mastectomies and breast reconstruction Follow-up in Dr. Monet's office next week for post-op appointment, possible drain removal

## 2018-12-21 NOTE — DISCHARGE NOTE ADULT - CARE PLAN
Principal Discharge DX:	Malignant neoplasm of female breast, unspecified estrogen receptor status, unspecified laterality, unspecified site of breast  Goal:	wound healing, drain care, recovery s/o mastectomies and breast reconstruction  Assessment and plan of treatment:	Follow-up in Dr. Monet's office next week for post-op appointment, possible drain removal

## 2018-12-21 NOTE — DISCHARGE NOTE ADULT - PATIENT PORTAL LINK FT
You can access the [facility] Patient Portal, offered by [facility], by registering with the following website: [email or web address]

## 2018-12-21 NOTE — DISCHARGE NOTE ADULT - REASON FOR ADMISSION
bilateral mastectomy, left axillary dissection, LYMPHA, bilateral breast reconstruction with YOAV flaps

## 2018-12-21 NOTE — DISCHARGE NOTE ADULT - CARE PROVIDER_API CALL
Guero Monet; SYDNEY), Otolaryngology; Plastic Surgery  [address]  [address]  [place] [address]  Phone: [phone]  Fax: [phone]

## 2018-12-21 NOTE — DISCHARGE NOTE ADULT - MEDICATION SUMMARY - MEDICATIONS TO TAKE
I will START or STAY ON the medications listed below when I get home from the hospital:    verapamil 180 mg/24 hours oral capsule, extended release  -- 1 cap(s) by mouth once a day  -- Indication: For Home medication    Benicar HCT 40 mg-12.5 mg oral tablet  -- 1 tab(s) by mouth once a day  -- Indication: For Home medication    levothyroxine 150 mcg (0.15 mg) oral tablet  -- 1 tab(s) by mouth once a day  -- Indication: For Home medication

## 2018-12-21 NOTE — DISCHARGE NOTE ADULT - ADDITIONAL INSTRUCTIONS
Empty and record drain output twice daily.  Refer to discharge/post-op instructions provided by Dr. Monet's office.

## 2018-12-21 NOTE — DISCHARGE NOTE ADULT - HOSPITAL COURSE
Patient underwent bilateral mastectomies and bilateral breast reconstruction with YOAV flaps in the OR. The patient tolerated the procedure well. Postoperatively the patient was sent to the PACU. The patient remained in the PACU overnight. The patient's flaps were monitored by [employer] and by clinical examination. On POD 1, the patient was hemodynamically stable; was transferred to a surgical floor; was advanced to a regular diet; was placed on her home medications; was out of bed to a chair, and Lester catheter was removed. On POD 2, the patient's Vioptix monitors were removed, and the patient ambulated. During the patient's hospital course, the patient's pain was controlled by IV pain medications and then by PO pain medications.    At the time of discharge, the patient was hemodynamically stable, was tolerating PO diet, was voiding urine, was ambulating, and was comfortable with adequate pain control. The patient was instructed to follow up with Dr. Monet within 1 week after discharge from the hospital. The patient felt comfortable with discharge. The patient received prescriptions for oral pain medication preoperatively from the attending surgeon.

## 2018-12-24 ENCOUNTER — APPOINTMENT (OUTPATIENT)
Dept: PLASTIC SURGERY | Facility: CLINIC | Age: 53
End: 2018-12-24
Payer: COMMERCIAL

## 2018-12-24 PROCEDURE — 99024 POSTOP FOLLOW-UP VISIT: CPT

## 2018-12-27 ENCOUNTER — APPOINTMENT (OUTPATIENT)
Dept: PLASTIC SURGERY | Facility: CLINIC | Age: 53
End: 2018-12-27
Payer: COMMERCIAL

## 2018-12-27 PROCEDURE — 99024 POSTOP FOLLOW-UP VISIT: CPT

## 2018-12-28 DIAGNOSIS — C50.912 MALIGNANT NEOPLASM OF UNSPECIFIED SITE OF LEFT FEMALE BREAST: ICD-10-CM

## 2018-12-28 DIAGNOSIS — E66.01 MORBID (SEVERE) OBESITY DUE TO EXCESS CALORIES: ICD-10-CM

## 2018-12-28 DIAGNOSIS — E03.9 HYPOTHYROIDISM, UNSPECIFIED: ICD-10-CM

## 2018-12-28 DIAGNOSIS — I10 ESSENTIAL (PRIMARY) HYPERTENSION: ICD-10-CM

## 2018-12-28 DIAGNOSIS — E78.5 HYPERLIPIDEMIA, UNSPECIFIED: ICD-10-CM

## 2018-12-28 DIAGNOSIS — Z79.899 OTHER LONG TERM (CURRENT) DRUG THERAPY: ICD-10-CM

## 2018-12-31 ENCOUNTER — APPOINTMENT (OUTPATIENT)
Dept: PLASTIC SURGERY | Facility: CLINIC | Age: 53
End: 2018-12-31
Payer: COMMERCIAL

## 2018-12-31 LAB — SURGICAL PATHOLOGY FINAL REPORT - CH: SIGNIFICANT CHANGE UP

## 2018-12-31 PROCEDURE — 99024 POSTOP FOLLOW-UP VISIT: CPT

## 2019-01-07 ENCOUNTER — APPOINTMENT (OUTPATIENT)
Dept: PLASTIC SURGERY | Facility: CLINIC | Age: 54
End: 2019-01-07
Payer: COMMERCIAL

## 2019-01-07 PROCEDURE — 99024 POSTOP FOLLOW-UP VISIT: CPT

## 2019-01-16 ENCOUNTER — APPOINTMENT (OUTPATIENT)
Dept: PLASTIC SURGERY | Facility: CLINIC | Age: 54
End: 2019-01-16
Payer: COMMERCIAL

## 2019-01-16 PROCEDURE — 99024 POSTOP FOLLOW-UP VISIT: CPT

## 2019-01-22 ENCOUNTER — APPOINTMENT (OUTPATIENT)
Dept: PLASTIC SURGERY | Facility: CLINIC | Age: 54
End: 2019-01-22
Payer: COMMERCIAL

## 2019-01-22 PROCEDURE — 99024 POSTOP FOLLOW-UP VISIT: CPT

## 2019-01-22 NOTE — REASON FOR VISIT
[Post Op: _________] : a [unfilled] post op visit [FreeTextEntry1] : patient is s/p bilateral breast reconstruction with YOAV flaps on 12/19/18.

## 2019-01-24 NOTE — REASON FOR VISIT
[Post Op: _________] : a [unfilled] post op visit [Spouse] : spouse [Other: _____] : [unfilled] [FreeTextEntry1] : DOS: 12/19/2018 s/p bilateral breast reconstruction w YOAV. Patient c/o slight opening at Right abdominal incision and slight oozing.

## 2019-01-25 ENCOUNTER — APPOINTMENT (OUTPATIENT)
Dept: PLASTIC SURGERY | Facility: CLINIC | Age: 54
End: 2019-01-25

## 2019-02-04 ENCOUNTER — APPOINTMENT (OUTPATIENT)
Dept: PLASTIC SURGERY | Facility: CLINIC | Age: 54
End: 2019-02-04
Payer: COMMERCIAL

## 2019-02-04 PROCEDURE — 99024 POSTOP FOLLOW-UP VISIT: CPT

## 2019-02-05 NOTE — REASON FOR VISIT
[FreeTextEntry1] :  DOS: 12/19/2018 s/p bilateral breast reconstruction w YOAV. Patient is getting better with time.

## 2019-02-11 ENCOUNTER — APPOINTMENT (OUTPATIENT)
Dept: PLASTIC SURGERY | Facility: CLINIC | Age: 54
End: 2019-02-11
Payer: COMMERCIAL

## 2019-02-11 PROCEDURE — 99024 POSTOP FOLLOW-UP VISIT: CPT

## 2019-02-19 ENCOUNTER — APPOINTMENT (OUTPATIENT)
Dept: PLASTIC SURGERY | Facility: CLINIC | Age: 54
End: 2019-02-19
Payer: COMMERCIAL

## 2019-02-19 PROCEDURE — 99024 POSTOP FOLLOW-UP VISIT: CPT

## 2019-02-23 NOTE — REASON FOR VISIT
[Follow-Up: _____] : a [unfilled] follow-up visit [FreeTextEntry1] : s/p bilateral breast reconstruction with YOAV flaps 12/19/18

## 2019-02-28 ENCOUNTER — APPOINTMENT (OUTPATIENT)
Dept: PLASTIC SURGERY | Facility: CLINIC | Age: 54
End: 2019-02-28
Payer: COMMERCIAL

## 2019-02-28 DIAGNOSIS — Z42.1 ENCOUNTER FOR BREAST RECONSTRUCTION FOLLOWING MASTECTOMY: ICD-10-CM

## 2019-02-28 PROCEDURE — 99024 POSTOP FOLLOW-UP VISIT: CPT

## 2019-03-02 ENCOUNTER — TRANSCRIPTION ENCOUNTER (OUTPATIENT)
Age: 54
End: 2019-03-02

## 2019-03-03 NOTE — REASON FOR VISIT
[Follow-Up: _____] : a [unfilled] follow-up visit [FreeTextEntry1] : DOS: 12/19/18 s/p Bilateral Breast Reconstruction w/ YOAV Flaps.

## 2019-03-11 ENCOUNTER — APPOINTMENT (OUTPATIENT)
Dept: PLASTIC SURGERY | Facility: CLINIC | Age: 54
End: 2019-03-11
Payer: COMMERCIAL

## 2019-03-11 PROCEDURE — 99024 POSTOP FOLLOW-UP VISIT: CPT

## 2019-03-11 NOTE — REASON FOR VISIT
[Post Op: _________] : a [unfilled] post op visit [Spouse] : spouse [Other: _____] : [unfilled] [FreeTextEntry1] :  DOS: 12/19/18 s/p Bilateral Breast Reconstruction w/ YOAV Flaps. Patient states abdominal wound has improved. Visiting nurse last visit on 3/8/19. Dressing applied.

## 2019-04-08 ENCOUNTER — APPOINTMENT (OUTPATIENT)
Age: 54
End: 2019-04-08
Payer: COMMERCIAL

## 2019-04-08 PROCEDURE — 99214 OFFICE O/P EST MOD 30 MIN: CPT

## 2019-05-20 ENCOUNTER — APPOINTMENT (OUTPATIENT)
Age: 54
End: 2019-05-20
Payer: COMMERCIAL

## 2019-05-20 PROCEDURE — 99212 OFFICE O/P EST SF 10 MIN: CPT

## 2019-05-21 NOTE — REASON FOR VISIT
[Spouse] : spouse [Follow-Up: _____] : a [unfilled] follow-up visit [Other: _____] : [unfilled] [FreeTextEntry1] : DOS: 12/19/18 s/p bilateral breast reconstruction with YOAV flaps.

## 2019-06-11 ENCOUNTER — INPATIENT (INPATIENT)
Facility: HOSPITAL | Age: 54
LOS: 0 days | Discharge: HOME | End: 2019-06-12
Attending: INTERNAL MEDICINE | Admitting: INTERNAL MEDICINE
Payer: COMMERCIAL

## 2019-06-11 VITALS
RESPIRATION RATE: 16 BRPM | DIASTOLIC BLOOD PRESSURE: 71 MMHG | TEMPERATURE: 99 F | HEIGHT: 55 IN | OXYGEN SATURATION: 100 % | SYSTOLIC BLOOD PRESSURE: 121 MMHG | WEIGHT: 293 LBS

## 2019-06-11 DIAGNOSIS — Z98.890 OTHER SPECIFIED POSTPROCEDURAL STATES: Chronic | ICD-10-CM

## 2019-06-11 DIAGNOSIS — I26.99 OTHER PULMONARY EMBOLISM WITHOUT ACUTE COR PULMONALE: ICD-10-CM

## 2019-06-11 DIAGNOSIS — Z98.89 OTHER SPECIFIED POSTPROCEDURAL STATES: Chronic | ICD-10-CM

## 2019-06-11 DIAGNOSIS — I80.229 PHLEBITIS AND THROMBOPHLEBITIS OF UNSPECIFIED POPLITEAL VEIN: ICD-10-CM

## 2019-06-11 DIAGNOSIS — Z90.89 ACQUIRED ABSENCE OF OTHER ORGANS: Chronic | ICD-10-CM

## 2019-06-11 LAB
ALBUMIN SERPL ELPH-MCNC: 3 G/DL — LOW (ref 3.3–5)
ALP SERPL-CCNC: 144 U/L — HIGH (ref 40–120)
ALT FLD-CCNC: 40 U/L — SIGNIFICANT CHANGE UP (ref 12–78)
ANION GAP SERPL CALC-SCNC: 7 MMOL/L — SIGNIFICANT CHANGE UP (ref 5–17)
APTT BLD: 101.1 SEC — HIGH (ref 27.5–36.3)
APTT BLD: 18.2 SEC — LOW (ref 27.5–36.3)
AST SERPL-CCNC: 34 U/L — SIGNIFICANT CHANGE UP (ref 15–37)
BILIRUB SERPL-MCNC: 0.9 MG/DL — SIGNIFICANT CHANGE UP (ref 0.2–1.2)
BUN SERPL-MCNC: 20 MG/DL — SIGNIFICANT CHANGE UP (ref 7–23)
CALCIUM SERPL-MCNC: 8.8 MG/DL — SIGNIFICANT CHANGE UP (ref 8.5–10.1)
CHLORIDE SERPL-SCNC: 105 MMOL/L — SIGNIFICANT CHANGE UP (ref 96–108)
CO2 SERPL-SCNC: 25 MMOL/L — SIGNIFICANT CHANGE UP (ref 22–31)
CREAT SERPL-MCNC: 0.82 MG/DL — SIGNIFICANT CHANGE UP (ref 0.5–1.3)
GLUCOSE SERPL-MCNC: 104 MG/DL — HIGH (ref 70–99)
HCT VFR BLD CALC: 30.9 % — LOW (ref 34.5–45)
HCT VFR BLD CALC: 32 % — LOW (ref 34.5–45)
HGB BLD-MCNC: 10.2 G/DL — LOW (ref 11.5–15.5)
HGB BLD-MCNC: 9.9 G/DL — LOW (ref 11.5–15.5)
INR BLD: 1.03 RATIO — SIGNIFICANT CHANGE UP (ref 0.88–1.16)
INR BLD: 1.13 RATIO — SIGNIFICANT CHANGE UP (ref 0.88–1.16)
MAGNESIUM SERPL-MCNC: 1.9 MG/DL — SIGNIFICANT CHANGE UP (ref 1.6–2.6)
MCHC RBC-ENTMCNC: 26.9 PG — LOW (ref 27–34)
MCHC RBC-ENTMCNC: 27.1 PG — SIGNIFICANT CHANGE UP (ref 27–34)
MCHC RBC-ENTMCNC: 31.9 GM/DL — LOW (ref 32–36)
MCHC RBC-ENTMCNC: 32 GM/DL — SIGNIFICANT CHANGE UP (ref 32–36)
MCV RBC AUTO: 84 FL — SIGNIFICANT CHANGE UP (ref 80–100)
MCV RBC AUTO: 84.9 FL — SIGNIFICANT CHANGE UP (ref 80–100)
NT-PROBNP SERPL-SCNC: 108 PG/ML — SIGNIFICANT CHANGE UP (ref 0–125)
PLATELET # BLD AUTO: 200 K/UL — SIGNIFICANT CHANGE UP (ref 150–400)
PLATELET # BLD AUTO: 227 K/UL — SIGNIFICANT CHANGE UP (ref 150–400)
POTASSIUM SERPL-MCNC: 4.4 MMOL/L — SIGNIFICANT CHANGE UP (ref 3.5–5.3)
POTASSIUM SERPL-SCNC: 4.4 MMOL/L — SIGNIFICANT CHANGE UP (ref 3.5–5.3)
PROT SERPL-MCNC: 6.8 GM/DL — SIGNIFICANT CHANGE UP (ref 6–8.3)
PROTHROM AB SERPL-ACNC: 11.4 SEC — SIGNIFICANT CHANGE UP (ref 10–12.9)
PROTHROM AB SERPL-ACNC: 12.6 SEC — SIGNIFICANT CHANGE UP (ref 10–12.9)
RBC # BLD: 3.68 M/UL — LOW (ref 3.8–5.2)
RBC # BLD: 3.77 M/UL — LOW (ref 3.8–5.2)
RBC # FLD: 19.4 % — HIGH (ref 10.3–14.5)
RBC # FLD: 19.8 % — HIGH (ref 10.3–14.5)
SODIUM SERPL-SCNC: 137 MMOL/L — SIGNIFICANT CHANGE UP (ref 135–145)
TROPONIN I SERPL-MCNC: <0.015 NG/ML — SIGNIFICANT CHANGE UP (ref 0.01–0.04)
TROPONIN I SERPL-MCNC: <0.015 NG/ML — SIGNIFICANT CHANGE UP (ref 0.01–0.04)
WBC # BLD: 10.46 K/UL — SIGNIFICANT CHANGE UP (ref 3.8–10.5)
WBC # BLD: 14.22 K/UL — HIGH (ref 3.8–10.5)
WBC # FLD AUTO: 10.46 K/UL — SIGNIFICANT CHANGE UP (ref 3.8–10.5)
WBC # FLD AUTO: 14.22 K/UL — HIGH (ref 3.8–10.5)

## 2019-06-11 PROCEDURE — 93306 TTE W/DOPPLER COMPLETE: CPT | Mod: 26

## 2019-06-11 PROCEDURE — 71275 CT ANGIOGRAPHY CHEST: CPT | Mod: 26

## 2019-06-11 PROCEDURE — 99285 EMERGENCY DEPT VISIT HI MDM: CPT

## 2019-06-11 PROCEDURE — 93971 EXTREMITY STUDY: CPT | Mod: 26,RT

## 2019-06-11 PROCEDURE — 93010 ELECTROCARDIOGRAM REPORT: CPT

## 2019-06-11 RX ORDER — LEVOTHYROXINE SODIUM 125 MCG
1 TABLET ORAL
Qty: 0 | Refills: 0 | DISCHARGE

## 2019-06-11 RX ORDER — LEVOTHYROXINE SODIUM 125 MCG
175 TABLET ORAL DAILY
Refills: 0 | Status: DISCONTINUED | OUTPATIENT
Start: 2019-06-11 | End: 2019-06-12

## 2019-06-11 RX ORDER — HEPARIN SODIUM 5000 [USP'U]/ML
10000 INJECTION INTRAVENOUS; SUBCUTANEOUS ONCE
Refills: 0 | Status: COMPLETED | OUTPATIENT
Start: 2019-06-11 | End: 2019-06-11

## 2019-06-11 RX ORDER — HEPARIN SODIUM 5000 [USP'U]/ML
5000 INJECTION INTRAVENOUS; SUBCUTANEOUS EVERY 6 HOURS
Refills: 0 | Status: DISCONTINUED | OUTPATIENT
Start: 2019-06-11 | End: 2019-06-12

## 2019-06-11 RX ORDER — HEPARIN SODIUM 5000 [USP'U]/ML
INJECTION INTRAVENOUS; SUBCUTANEOUS
Qty: 25000 | Refills: 0 | Status: DISCONTINUED | OUTPATIENT
Start: 2019-06-11 | End: 2019-06-12

## 2019-06-11 RX ORDER — IBUPROFEN 200 MG
400 TABLET ORAL EVERY 6 HOURS
Refills: 0 | Status: DISCONTINUED | OUTPATIENT
Start: 2019-06-11 | End: 2019-06-12

## 2019-06-11 RX ORDER — LORATADINE 10 MG/1
10 TABLET ORAL DAILY
Refills: 0 | Status: DISCONTINUED | OUTPATIENT
Start: 2019-06-11 | End: 2019-06-12

## 2019-06-11 RX ORDER — OLMESARTAN MEDOXOMIL-HYDROCHLOROTHIAZIDE 25; 40 MG/1; MG/1
1 TABLET, FILM COATED ORAL
Qty: 0 | Refills: 0 | DISCHARGE

## 2019-06-11 RX ORDER — VERAPAMIL HCL 240 MG
180 CAPSULE, EXTENDED RELEASE PELLETS 24 HR ORAL DAILY
Refills: 0 | Status: DISCONTINUED | OUTPATIENT
Start: 2019-06-11 | End: 2019-06-12

## 2019-06-11 RX ORDER — SENNA PLUS 8.6 MG/1
2 TABLET ORAL AT BEDTIME
Refills: 0 | Status: DISCONTINUED | OUTPATIENT
Start: 2019-06-11 | End: 2019-06-12

## 2019-06-11 RX ORDER — HEPARIN SODIUM 5000 [USP'U]/ML
10000 INJECTION INTRAVENOUS; SUBCUTANEOUS EVERY 6 HOURS
Refills: 0 | Status: DISCONTINUED | OUTPATIENT
Start: 2019-06-11 | End: 2019-06-12

## 2019-06-11 RX ORDER — ONDANSETRON 8 MG/1
4 TABLET, FILM COATED ORAL EVERY 6 HOURS
Refills: 0 | Status: DISCONTINUED | OUTPATIENT
Start: 2019-06-11 | End: 2019-06-12

## 2019-06-11 RX ADMIN — HEPARIN SODIUM 2100 UNIT(S)/HR: 5000 INJECTION INTRAVENOUS; SUBCUTANEOUS at 19:33

## 2019-06-11 RX ADMIN — HEPARIN SODIUM 2400 UNIT(S)/HR: 5000 INJECTION INTRAVENOUS; SUBCUTANEOUS at 12:16

## 2019-06-11 RX ADMIN — HEPARIN SODIUM 10000 UNIT(S): 5000 INJECTION INTRAVENOUS; SUBCUTANEOUS at 12:15

## 2019-06-11 NOTE — ED PROVIDER NOTE - CARDIAC, MLM
+tachycardic rate, regular rhythm.  Heart sounds S1, S2.  No murmurs, rubs or gallops. Bedside US shows no heart strain.

## 2019-06-11 NOTE — ED ADULT NURSE NOTE - OBJECTIVE STATEMENT
Patient presents to ED complaining of L Leg DVT. Patient complaining of LLE pain starting Sunday, patient was seen for outpatient doppler today, +DVT in LLE. Patient complaining of increased pain with ambulation. Patient denies any CP, SOB, weakness, recent travel, dizziness, fever chills. Patient currently receiving chemo treatment at [facility] for breast CA, last treatment Wednesday. A&0x3. Patient denies hx of DVT, not on anticoagulation

## 2019-06-11 NOTE — CONSULT NOTE ADULT - SUBJECTIVE AND OBJECTIVE BOX
Patient is a 53y old  Female who presents with a chief complaint of L eg pain and sob.     HPI: 53yof with pmh of known breast malignancy presented with c/o SOB and L leg pain.  Pt denies any CP or dizziness.   She was noted to have a popliteal DVT and referred to ER for further evaluation by her cardiologist.       PAST MEDICAL & SURGICAL HISTORY:  Metabolic syndrome  History of mixed hyperlipidemia  Peripheral venous insufficiency: left leg  Breast cancer: left  Polycystic ovary disease  Esophageal dysmotility: due to to lap band  Obesity, Class II, BMI 35-39.9  Subdural hematoma, nontraumatic: 2013  Hay fever  GERD (gastroesophageal reflux disease)  Sleep apnea: no cpap  Thyroid cancer: surgery 1992  Hypertension  History of tonsillectomy  H/O prior ablation treatment: left leg  S/P evacuation of subdural hematoma: 13 craniotomy  S/P hysterectomy: 11/17/10- FARHAN/BSO- polycystic ovaries   delivery NOS: 3 c- sections 3/87, ,   Thyroid cancer: Thyroidectomy  for thyroid cancer      MEDICATIONS  (STANDING):  heparin  Infusion.  Unit(s)/Hr (24 mL/Hr) IV Continuous <Continuous>  heparin  Injectable 61238 Unit(s) IV Push once    MEDICATIONS  (PRN):  heparin  Injectable 86220 Unit(s) IV Push every 6 hours PRN For aPTT less than 40  heparin  Injectable 5000 Unit(s) IV Push every 6 hours PRN For aPTT between 40 - 57      FAMILY HISTORY:  Family history of breast cancer in male (Sibling)  Family history of macular degeneration (Mother)  Family history of hypertension (Mother)  Family history of aortic aneurysm (Father)      SOCIAL HISTORY:  no recent smoking     REVIEW OF SYSTEMS:  CONSTITUTIONAL:    No fatigue, malaise, lethargy.  No fever or chills.  HEENT:  Eyes:  No visual changes.     ENT:  No epistaxis.  No sinus pain.    RESPIRATORY:  No cough.  No wheeze.  No hemoptysis.  c/o shortness of breath.  CARDIOVASCULAR:  No chest pains.  No palpitations. c/o shortness of breath, No orthopnea or PND.  GASTROINTESTINAL:  No abdominal pain.  No nausea or vomiting.    GENITOURINARY:    No hematuria.    MUSCULOSKELETAL:  L leg pain   NEUROLOGICAL:  No tingling or numbness or weakness.  PSYCHIATRIC:  No confusion  SKIN:  No rashes.    ENDOCRINE:  No unexplained weight loss.  No polydipsia.   HEMATOLOGIC:  No anemia.  No prolonged or excessive bleeding.   ALLERGIC AND IMMUNOLOGIC:  No pruritus.          Vital Signs Last 24 Hrs  T(C): 37.2 (2019 11:35), Max: 37.2 (2019 10:03)  T(F): 98.9 (2019 11:35), Max: 99 (2019 10:03)  HR: 107 (2019 11:35) (107 - 113)  BP: 100/63 (2019 11:35) (100/63 - 121/71)  BP(mean): --  RR: 16 (2019 11:35) (16 - 16)  SpO2: 100% (:35) (100% - 100%)    PHYSICAL EXAM-    Constitutional: The patient appears to be normal, well developed, well nourished and alert and oriented to time, place and person. The patient does not appear acutely ill.     Head: Head is normocephalic and atraumatic.      Neck: No jugular venous distention. No audible carotid bruits. There are strong carotid pulses bilaterally. No JVD.     Cardiovascular: Regular rate and rhythm without S3, S4. No murmurs or rubs are appreciated.      Respiratory: Breathsounds are normal. No rales. No wheezing.    Abdomen: Soft, nontender, nondistended with positive bowel sounds.      Extremity: LLE tenderness   Neurologic: The patient is alert and oriented.      Skin: No rash, no obvious lesions noted.      Psychiatric: The patient appears to be emotionally stable.      INTERPRETATION OF TELEMETRY: sinus tachycardia with isolated PVCs     ECG: Sinus tachycardia, normal axis, no st t chnages. isolated PVCs     I&O's Detail      LABS:                        10.2   14.22 )-----------( 200      ( 2019 11:04 )             32.0     06-11    137  |  105  |  20  ----------------------------<  104<H>  4.4   |  25  |  0.82    Ca    8.8      2019 11:04  Mg     1.9     06-11    TPro  6.8  /  Alb  3.0<L>  /  TBili  0.9  /  DBili  x   /  AST  34  /  ALT  40  /  AlkPhos  144<H>  0611    CARDIAC MARKERS ( 2019 11:04 )  <0.015 ng/mL / x     / x     / x     / x              I&O's Summary    BNPSerum Pro-Brain Natriuretic Peptide: 108 pg/mL ( @ 11:04)    RADIOLOGY & ADDITIONAL STUDIES: Patient is a 53y old  Female who presents with a chief complaint of L eg pain and sob.     HPI: 53yof with pmh of known breast malignancy presented with c/o SOB and L leg pain.  Pt denies any CP or dizziness.   She was noted to have a popliteal DVT and referred to ER for further evaluation by her cardiologist.       PAST MEDICAL & SURGICAL HISTORY:  Metabolic syndrome  History of mixed hyperlipidemia  Peripheral venous insufficiency: left leg  Breast cancer: left  Polycystic ovary disease  Esophageal dysmotility: due to to lap band  Obesity, Class II, BMI 35-39.9  Subdural hematoma, nontraumatic: 2013  Hay fever  GERD (gastroesophageal reflux disease)  Sleep apnea: no cpap  Thyroid cancer: surgery 1992  Hypertension  History of tonsillectomy  H/O prior ablation treatment: left leg  S/P evacuation of subdural hematoma: 13 craniotomy  S/P hysterectomy: 11/17/10- FARHAN/BSO- polycystic ovaries   delivery NOS: 3 c- sections 3/87, ,   Thyroid cancer: Thyroidectomy  for thyroid cancer      MEDICATIONS  (STANDING):  heparin  Infusion.  Unit(s)/Hr (24 mL/Hr) IV Continuous <Continuous>  heparin  Injectable 35905 Unit(s) IV Push once    MEDICATIONS  (PRN):  heparin  Injectable 94378 Unit(s) IV Push every 6 hours PRN For aPTT less than 40  heparin  Injectable 5000 Unit(s) IV Push every 6 hours PRN For aPTT between 40 - 57      FAMILY HISTORY:  Family history of breast cancer in male (Sibling)  Family history of macular degeneration (Mother)  Family history of hypertension (Mother)  Family history of aortic aneurysm (Father)      SOCIAL HISTORY:  no recent smoking     REVIEW OF SYSTEMS:  CONSTITUTIONAL:    No fatigue, malaise, lethargy.  No fever or chills.  HEENT:  Eyes:  No visual changes.     ENT:  No epistaxis.  No sinus pain.    RESPIRATORY:  No cough.  No wheeze.  No hemoptysis.  c/o shortness of breath.  CARDIOVASCULAR:  No chest pains.  No palpitations. c/o shortness of breath, No orthopnea or PND.  GASTROINTESTINAL:  No abdominal pain.  No nausea or vomiting.    GENITOURINARY:    No hematuria.    MUSCULOSKELETAL:  L leg pain   NEUROLOGICAL:  No tingling or numbness or weakness.  PSYCHIATRIC:  No confusion  SKIN:  No rashes.    ENDOCRINE:  No unexplained weight loss.  No polydipsia.   HEMATOLOGIC:  No anemia.  No prolonged or excessive bleeding.   ALLERGIC AND IMMUNOLOGIC:  No pruritus.          Vital Signs Last 24 Hrs  T(C): 37.2 (2019 11:35), Max: 37.2 (2019 10:03)  T(F): 98.9 (2019 11:35), Max: 99 (2019 10:03)  HR: 107 (2019 11:35) (107 - 113)  BP: 100/63 (2019 11:35) (100/63 - 121/71)  BP(mean): --  RR: 16 (2019 11:35) (16 - 16)  SpO2: 100% (:35) (100% - 100%)    PHYSICAL EXAM-    Constitutional: The patient appears to be normal, well developed, well nourished and alert and oriented to time, place and person. The patient does not appear acutely ill.     Head: Head is normocephalic and atraumatic.      Neck: No jugular venous distention. No audible carotid bruits. There are strong carotid pulses bilaterally. No JVD.     Cardiovascular: Regular rate and rhythm without S3, S4. No murmurs or rubs are appreciated.      Respiratory: Breathsounds are normal. No rales. No wheezing.    Abdomen: Soft, nontender, nondistended with positive bowel sounds.      Extremity: LLE tenderness   Neurologic: The patient is alert and oriented.      Skin: No rash, no obvious lesions noted.      Psychiatric: The patient appears to be emotionally stable.      INTERPRETATION OF TELEMETRY: sinus tachycardia with isolated PVCs     ECG: Sinus tachycardia, normal axis, no st t chnages. isolated PVCs     I&O's Detail      LABS:                        10.2   14.22 )-----------( 200      ( 2019 11:04 )             32.0     06-11    137  |  105  |  20  ----------------------------<  104<H>  4.4   |  25  |  0.82    Ca    8.8      2019 11:04  Mg     1.9     06-11    TPro  6.8  /  Alb  3.0<L>  /  TBili  0.9  /  DBili  x   /  AST  34  /  ALT  40  /  AlkPhos  144<H>  0611    CARDIAC MARKERS ( 2019 11:04 )  <0.015 ng/mL / x     / x     / x     / x              I&O's Summary    BNPSerum Pro-Brain Natriuretic Peptide: 108 pg/mL ( @ 11:04)    RADIOLOGY & ADDITIONAL STUDIES:  < from: CT Angio Chest PE Protocol w/ IV Cont (19 @ 12:02) >    EXAM:  CTA CHEST PE PROTOCOL (W)AW IC                            PROCEDURE DATE:  2019          INTERPRETATION:  CLINICAL INFORMATION: Left lower extremity deep vein   thrombosis and tachycardia. Rule out pulmonary embolism    COMPARISON: None.    PROCEDURE:   CTA of the Chest was performed with intravenous contrast.  90 ml of Omnipaque 350 was injected intravenously. 10 ml were discarded.  Sagittal and coronal reformats were performed as well as MIP   reconstructions.    FINDINGS:    LOWER NECK: Within normal limits.  AXILLA, MEDIASTINUM AND BENJAMIN: No lymphadenopathy.  VESSELS: Pulmonary embolism involving the right main pulmonary artery   extending into lower lobe segmental and subsegmental branches and also   within subsegmental branches in the left lower lobe. Right-sided central   venous port with tip in the superior vena cava. Multiple surgical clips   in the superior mediastinum.  HEART: The heart is enlarged.  No pericardial effusion. Straightening of   the ventricular septum raising the possibility of right heart strain.  PLEURA: No pleural effusion.  LUNGS AND LARGE AIRWAYS: No pulmonary nodule, mass or consolidation.   Patent central airways.   VISUALIZED UPPER ABDOMEN: 4.2 cm lamellated gallstone. Subcentimeter   indeterminate hypodense lesion in the spleen  BONES: No acute abnormality.  CHEST WALL:  Bilateral mastectomy and breast reconstruction    IMPRESSION:     Moderate amount of pulmonary embolism as detailed above.  Suspicion for right heart strain.    Findings discussed with Elías Leiva and Tamara on 19, 1215 hours.    CHRIS HOOPER   This document has been electronically signed. 2019 12:20PM        < end of copied text >

## 2019-06-11 NOTE — ED ADULT NURSE NOTE - NSIMPLEMENTINTERV_GEN_ALL_ED
Implemented All Fall Risk Interventions:  [place] to call system. Call bell, personal items and telephone within reach. Instruct patient to call for assistance. Room bathroom lighting operational. Non-slip footwear when patient is off stretcher. Physically safe environment: no spills, clutter or unnecessary equipment. Stretcher in lowest position, wheels locked, appropriate side rails in place. Provide visual cue, wrist band, yellow gown, etc. Monitor gait and stability. Monitor for mental status changes and reorient to person, place, and time. Review medications for side effects contributing to fall risk. Reinforce activity limits and safety measures with patient and family.

## 2019-06-11 NOTE — ED PROVIDER NOTE - CLINICAL SUMMARY MEDICAL DECISION MAKING FREE TEXT BOX
53 F with PMHx of breast CA was diagnosed this morning with a DVT behind L knee. Will obtain CT scan r/o PE, IV fluid, labs, Heparin. 53 F with PMHx of breast CA was diagnosed this morning with a DVT behind L knee. Will obtain EKG, CT scan r/o PE, IV fluid, labs, Heparin.

## 2019-06-11 NOTE — ED ADULT NURSE REASSESSMENT NOTE - NS ED NURSE REASSESS COMMENT FT1
Patient comfortable in bed awaiting hospitalist orders and bed placement. Educated patient on admission process, patient verbally understood. Patient denies any SOB, CP, dizziness, no complaints at this time. Will continue to monitor

## 2019-06-11 NOTE — H&P ADULT - NSHPPHYSICALEXAM_GEN_ALL_CORE
Vital Signs Last 24 Hrs  T(C): 37.2 (11 Jun 2019 17:13), Max: 37.2 (11 Jun 2019 10:03)  T(F): 99 (11 Jun 2019 17:13), Max: 99 (11 Jun 2019 10:03)  HR: 115 (11 Jun 2019 17:13) (107 - 115)  BP: 102/56 (11 Jun 2019 17:13) (98/61 - 121/71)  BP(mean): --  RR: 17 (11 Jun 2019 17:13) (16 - 17)  SpO2: 97% (11 Jun 2019 17:13) (97% - 100%)    GEN: lying in bed, NAD  HEENT:   NC/AT  CV:  +S1, +S2, tachy   RESP:   lungs clear to auscultation bilaterally, no wheeze, rales, rhonchi   GI:  abdomen soft, non-tender, non-distended, normoactive BS  MSK:   normal muscle tone  EXT:  LLE larger than RLE + calf tenderness LLE  NEURO:  AAOX3, no focal neurological deficits  SKIN:  abd wall wound noted with dressing in place

## 2019-06-11 NOTE — H&P ADULT - HISTORY OF PRESENT ILLNESS
pt is 54 yo female with PMH of left breast CA undergoing chemo at [place] with last chemo 6 days ago, GERD, HTN, chronic abd wound, HTN, HL here after noticing pain and swelling of LLE and seen in [place] office (Daughter works for Dr. Haro) with LE dopplers positive for DVT in LLE.  pt noted to be SOB and CT pe protocol showed b/l PE wtih stat ECHO done to r/o RV strain whichw as negative as per cardio consult Dr. Leiva.  Pt started on heparin drip and now only c/o LLE pain with ambulation deneis cp, pleuritic chest pain, dizziness.      ROS:   All 10 systems reviewed and found to be negative with the exception of what has been described above.    Allergies and Intolerances:        Allergies:  	No Known Allergies:     Patient History:    Past Medical History:  Breast cancer  left  Esophageal dysmotility    GERD (gastroesophageal reflux disease)    Hay fever    Hypertension    Obesity, Class II, BMI 35-39.9    Peripheral venous insufficiency  left leg  Polycystic ovary disease    Sleep apnea  uses c-pap  Subdural hematoma, nontraumatic  2013  Thyroid cancer  surgery 1992.     Past Surgical History:  breast reconstruction with flap    delivery NOS  3 c- sections 3/87, ,   H/O prior ablation treatment  left leg  S/P evacuation of subdural hematoma  13  S/P gastric surgery  Lap Band   S/P hysterectomy  11/17/10- FARHAN/BSO- polycystic ovaries  Thyroid cancer  Thyroidectomy  for thyroid cancer.     Family History:  Father -  - Family history of aortic aneurysm, Age at diagnosis: Age Unknown     Mother   Still living? Yes, Estimated age: 81-90  Family history of hypertension, Age at diagnosis: Age Unknown  Family history of macular degeneration, Age at diagnosis: Age Unknown      Social History:  Social History -  , lives at home with spouse. no tob/occ etoh/ no drug use

## 2019-06-11 NOTE — ED PROVIDER NOTE - CARE PLAN
Principal Discharge DX:	Acute pulmonary embolism without acute cor pulmonale, unspecified pulmonary embolism type

## 2019-06-11 NOTE — CONSULT NOTE ADULT - ASSESSMENT
SOB and left leg pain - 1. SOB and left leg pain - bilateral pulmonary embolism. Reviewed stat 2 D echocardiogram.  no evidence of RV strain.  Iv heparin to be continued.   Admit to telemetry.    2.Isolated premature ventricular contractions- close monitoring of the electrolytes.  Goal potassium of 4 and magnesium of 2.     3. HTN- continue home meds.     Other medical issues- Management per primary team.   Thank you for allowing me to participate in the care of this patient. Please feel free to contact me with any questions.

## 2019-06-11 NOTE — ED PROVIDER NOTE - SKIN, MLM
Skin normal color for race, warm, dry and intact. No evidence of rash. +port on R chest wall. Port site looks good, no erythema or induration. +bandage across lower abdomen.

## 2019-06-11 NOTE — H&P ADULT - ASSESSMENT
pt is 52 yo female with PMH of left breast CA undergoing chemo at [place] with last chemo 6 days ago, GERD, HTN, chronic abd wound, HTN, HL here after noticing pain and swelling of LLE and seen in [place] office (Daughter works for Dr. Haro) with LE dopplers positive for DVT in LLE. now with b/l PE    # LLE DVT/bilateral PE - cardio input noted and ECHO reviewed with no evidence of RV strain  - admit to tele as per cardio recs  - pt on heparin drip, will continue.  decide on DOAC vs coumadin upon dc, will need to confer with dr. limon her heme/onc  - PVCs noted and will monitor and replete lytes    # HTN/HL - continue home meds with parameters.  normotensive    # breast CA  - continue chemo as outpt with dr limon at [facility]    # abd wound - result of plastic surgery for flap for breast pt gets dressing changed by family and was due for change today which likely explains slight leukocytosis   - wound care consult.      # hypothyroidism - continue synthroid  - check TSH     DVT proph- on heparin drip pt is 54 yo female with PMH of left breast CA undergoing chemo at [place] with last chemo 6 days ago, GERD, HTN, chronic abd wound, HTN, HL here after noticing pain and swelling of LLE and seen in [place] office (Daughter works for Dr. Haro) with LE dopplers positive for DVT in LLE. now with b/l PE    # LLE DVT/bilateral PE - cardio input noted and ECHO reviewed with no evidence of RV strain  - admit to tele as per cardio recs  - pt on heparin drip, will continue.  decide on DOAC vs coumadin upon dc, will need to confer with dr. limon her heme/onc  - PVCs noted and will monitor and replete lytes    # HTN/HL - continue home meds with parameters.  normotensive. pt was takign wrong dose of benicar at home (40mg) recently chagned to candesartan 16-hctz 25 mg which is what she should be discharged on if Bps table for now will just continue verpamapil and restart meds in AM    # breast CA  - continue chemo as outpt with dr limon at [facility]    # abd wound - result of plastic surgery for flap for breast pt gets dressing changed by family and was due for change today which likely explains slight leukocytosis   - wound care consult.    - [facility] solomon Monet    # hypothyroidism - continue synthroid  - check TSH     DVT proph- on heparin drip

## 2019-06-11 NOTE — ED PROVIDER NOTE - PROGRESS NOTE DETAILS
Andres GREENFIELD for ED attending, Dr. Hammonds: MD Leiva heard from radiology, pt has b/l PE's. Ordering bedside echo to r/o right heart strain. Heparin being started at this time.

## 2019-06-11 NOTE — ED PROVIDER NOTE - PMH
Breast cancer  left  Esophageal dysmotility  due to to lap band  GERD (gastroesophageal reflux disease)    Hay fever    History of mixed hyperlipidemia    Hypertension    Metabolic syndrome    Obesity, Class II, BMI 35-39.9    Peripheral venous insufficiency  left leg  Polycystic ovary disease    Sleep apnea  no cpap  Subdural hematoma, nontraumatic  2/2013  Thyroid cancer  surgery Decembe 1992

## 2019-06-11 NOTE — H&P ADULT - NSHPLABSRESULTS_GEN_ALL_CORE
10.2   14.22 )-----------( 200      ( 11 Jun 2019 11:04 )             32.0     06-11    137  |  105  |  20  ----------------------------<  104<H>  4.4   |  25  |  0.82    Ca    8.8      11 Jun 2019 11:04  Mg     1.9     06-11    TPro  6.8  /  Alb  3.0<L>  /  TBili  0.9  /  DBili  x   /  AST  34  /  ALT  40  /  AlkPhos  144<H>  06-11    CARDIAC MARKERS ( 11 Jun 2019 14:44 )  <0.015 ng/mL / x     / x     / x     / x      CARDIAC MARKERS ( 11 Jun 2019 11:04 )  <0.015 ng/mL / x     / x     / x     / x          LIVER FUNCTIONS - ( 11 Jun 2019 11:04 )  Alb: 3.0 g/dL / Pro: 6.8 gm/dL / ALK PHOS: 144 U/L / ALT: 40 U/L / AST: 34 U/L / GGT: x           PT/INR - ( 11 Jun 2019 11:04 )   PT: 11.4 sec;   INR: 1.03 ratio         PTT - ( 11 Jun 2019 11:04 )  PTT:18.2 sec      < from: CT Angio Chest PE Protocol w/ IV Cont (06.11.19 @ 12:02) >    LOWER NECK: Within normal limits.  AXILLA, MEDIASTINUM AND BENJAMIN: No lymphadenopathy.  VESSELS: Pulmonary embolism involving the right main pulmonary artery   extending into lower lobe segmental and subsegmental branches and also   within subsegmental branches in the left lower lobe. Right-sided central   venous port with tip in the superior vena cava. Multiple surgical clips   in the superior mediastinum.  HEART: The heart is enlarged.  No pericardial effusion. Straightening of   the ventricular septum raising the possibility of right heart strain.  PLEURA: No pleural effusion.  LUNGS AND LARGE AIRWAYS: No pulmonary nodule, mass or consolidation.   Patent central airways.   VISUALIZED UPPER ABDOMEN: 4.2 cm lamellated gallstone. Subcentimeter   indeterminate hypodense lesion in the spleen  BONES: No acute abnormality.  CHEST WALL:  Bilateral mastectomy and breast reconstruction    IMPRESSION:     Moderate amount of pulmonary embolism as detailed above.  Suspicion for right heart strain.    < end of copied text >            < end of copied text >    < from: Transthoracic Echocardiogram (06.11.19 @ 12:58) >    The mitral valve leaflets are well seen thin and pliable; preserved   leaflet excursion noted. No mitral valve regurgitation noted.   The aortic valve is well visualized, appears normal. Valve opening seems   to be normal.   No aortic regurgitation is present.   The tricuspid valve leaflets are well seen and appear thin and pliable   with preserved leaflets excursion. No tricuspidregurgitation noted.   The pulmonic valve is not well seen. Trivial pulmonic regurgitation   noted.   Normal appearing left atrium.   Left ventricle systolic function appears preserved based on difficult   transthoracic views; segmental wall motion abnormalities can not be rule   out. Visual estimation of left ventricle ejection fraction is >50%.   Normal appearing right atrium.   Normal appearing right ventricle structure and function.   All visualized extra cardiac structures appears to be normal.   No evidence of pericardial effusion.    < end of copied text >

## 2019-06-11 NOTE — ED ADULT TRIAGE NOTE - CHIEF COMPLAINT QUOTE
Pt c.o left leg pain aisince sunday, seen by MD with positive DVT dx. pt undergoing chemo for breast ca, last treatment wednesday, no fevers. no SOb or dizziness. generalized weakness.

## 2019-06-12 ENCOUNTER — TRANSCRIPTION ENCOUNTER (OUTPATIENT)
Age: 54
End: 2019-06-12

## 2019-06-12 VITALS — WEIGHT: 293 LBS

## 2019-06-12 LAB
ALBUMIN SERPL ELPH-MCNC: 2.9 G/DL — LOW (ref 3.3–5)
ALP SERPL-CCNC: 119 U/L — SIGNIFICANT CHANGE UP (ref 40–120)
ALT FLD-CCNC: 33 U/L — SIGNIFICANT CHANGE UP (ref 12–78)
ANION GAP SERPL CALC-SCNC: 6 MMOL/L — SIGNIFICANT CHANGE UP (ref 5–17)
ANISOCYTOSIS BLD QL: SLIGHT — SIGNIFICANT CHANGE UP
APTT BLD: 72.1 SEC — HIGH (ref 27.5–36.3)
APTT BLD: 74.5 SEC — HIGH (ref 27.5–36.3)
AST SERPL-CCNC: 24 U/L — SIGNIFICANT CHANGE UP (ref 15–37)
BASOPHILS # BLD AUTO: 0.08 K/UL — SIGNIFICANT CHANGE UP (ref 0–0.2)
BASOPHILS NFR BLD AUTO: 1 % — SIGNIFICANT CHANGE UP (ref 0–2)
BILIRUB SERPL-MCNC: 0.8 MG/DL — SIGNIFICANT CHANGE UP (ref 0.2–1.2)
BUN SERPL-MCNC: 15 MG/DL — SIGNIFICANT CHANGE UP (ref 7–23)
CALCIUM SERPL-MCNC: 8.9 MG/DL — SIGNIFICANT CHANGE UP (ref 8.5–10.1)
CHLORIDE SERPL-SCNC: 104 MMOL/L — SIGNIFICANT CHANGE UP (ref 96–108)
CO2 SERPL-SCNC: 28 MMOL/L — SIGNIFICANT CHANGE UP (ref 22–31)
CREAT SERPL-MCNC: 0.8 MG/DL — SIGNIFICANT CHANGE UP (ref 0.5–1.3)
ELLIPTOCYTES BLD QL SMEAR: SLIGHT — SIGNIFICANT CHANGE UP
EOSINOPHIL # BLD AUTO: 0 K/UL — SIGNIFICANT CHANGE UP (ref 0–0.5)
EOSINOPHIL NFR BLD AUTO: 0 % — SIGNIFICANT CHANGE UP (ref 0–6)
GLUCOSE SERPL-MCNC: 124 MG/DL — HIGH (ref 70–99)
HCT VFR BLD CALC: 28.5 % — LOW (ref 34.5–45)
HCV AB S/CO SERPL IA: 0.08 S/CO — SIGNIFICANT CHANGE UP (ref 0–0.99)
HCV AB SERPL-IMP: SIGNIFICANT CHANGE UP
HGB BLD-MCNC: 9.1 G/DL — LOW (ref 11.5–15.5)
LYMPHOCYTES # BLD AUTO: 1.13 K/UL — SIGNIFICANT CHANGE UP (ref 1–3.3)
LYMPHOCYTES # BLD AUTO: 15 % — SIGNIFICANT CHANGE UP (ref 13–44)
MACROCYTES BLD QL: SLIGHT — SIGNIFICANT CHANGE UP
MANUAL SMEAR VERIFICATION: SIGNIFICANT CHANGE UP
MCHC RBC-ENTMCNC: 27.4 PG — SIGNIFICANT CHANGE UP (ref 27–34)
MCHC RBC-ENTMCNC: 31.9 GM/DL — LOW (ref 32–36)
MCV RBC AUTO: 85.8 FL — SIGNIFICANT CHANGE UP (ref 80–100)
MICROCYTES BLD QL: SLIGHT — SIGNIFICANT CHANGE UP
MONOCYTES # BLD AUTO: 0.38 K/UL — SIGNIFICANT CHANGE UP (ref 0–0.9)
MONOCYTES NFR BLD AUTO: 5 % — SIGNIFICANT CHANGE UP (ref 2–14)
NEUTROPHILS # BLD AUTO: 5.95 K/UL — SIGNIFICANT CHANGE UP (ref 1.8–7.4)
NEUTROPHILS NFR BLD AUTO: 76 % — SIGNIFICANT CHANGE UP (ref 43–77)
NEUTS BAND # BLD: 3 % — SIGNIFICANT CHANGE UP (ref 0–8)
NRBC # BLD: 0 /100 — SIGNIFICANT CHANGE UP (ref 0–0)
NRBC # BLD: SIGNIFICANT CHANGE UP /100 WBCS (ref 0–0)
PLAT MORPH BLD: NORMAL — SIGNIFICANT CHANGE UP
PLATELET # BLD AUTO: 215 K/UL — SIGNIFICANT CHANGE UP (ref 150–400)
POIKILOCYTOSIS BLD QL AUTO: SLIGHT — SIGNIFICANT CHANGE UP
POTASSIUM SERPL-MCNC: 3.7 MMOL/L — SIGNIFICANT CHANGE UP (ref 3.5–5.3)
POTASSIUM SERPL-SCNC: 3.7 MMOL/L — SIGNIFICANT CHANGE UP (ref 3.5–5.3)
PROT SERPL-MCNC: 6.3 GM/DL — SIGNIFICANT CHANGE UP (ref 6–8.3)
RBC # BLD: 3.32 M/UL — LOW (ref 3.8–5.2)
RBC # FLD: 19.3 % — HIGH (ref 10.3–14.5)
RBC BLD AUTO: ABNORMAL
SODIUM SERPL-SCNC: 138 MMOL/L — SIGNIFICANT CHANGE UP (ref 135–145)
T3 SERPL-MCNC: 66 NG/DL — LOW (ref 80–200)
T4 AB SER-ACNC: 7.5 UG/DL — SIGNIFICANT CHANGE UP (ref 4.6–12)
TSH SERPL-MCNC: 28 UU/ML — HIGH (ref 0.34–4.82)
TSH SERPL-MCNC: 30.8 UU/ML — HIGH (ref 0.34–4.82)
WBC # BLD: 7.53 K/UL — SIGNIFICANT CHANGE UP (ref 3.8–10.5)
WBC # FLD AUTO: 7.53 K/UL — SIGNIFICANT CHANGE UP (ref 3.8–10.5)

## 2019-06-12 PROCEDURE — 93010 ELECTROCARDIOGRAM REPORT: CPT

## 2019-06-12 RX ORDER — ENOXAPARIN SODIUM 100 MG/ML
130 INJECTION SUBCUTANEOUS
Refills: 0 | Status: DISCONTINUED | OUTPATIENT
Start: 2019-06-12 | End: 2019-06-12

## 2019-06-12 RX ORDER — IBUPROFEN 200 MG
2 TABLET ORAL
Qty: 0 | Refills: 0 | DISCHARGE

## 2019-06-12 RX ORDER — ENOXAPARIN SODIUM 100 MG/ML
140 INJECTION SUBCUTANEOUS
Refills: 0 | Status: DISCONTINUED | OUTPATIENT
Start: 2019-06-12 | End: 2019-06-12

## 2019-06-12 RX ORDER — ENOXAPARIN SODIUM 100 MG/ML
140 INJECTION SUBCUTANEOUS
Qty: 60 | Refills: 2
Start: 2019-06-12 | End: 2019-09-09

## 2019-06-12 RX ADMIN — HEPARIN SODIUM 2100 UNIT(S)/HR: 5000 INJECTION INTRAVENOUS; SUBCUTANEOUS at 02:17

## 2019-06-12 RX ADMIN — Medication 180 MILLIGRAM(S): at 05:40

## 2019-06-12 RX ADMIN — LORATADINE 10 MILLIGRAM(S): 10 TABLET ORAL at 11:44

## 2019-06-12 RX ADMIN — HEPARIN SODIUM 2100 UNIT(S)/HR: 5000 INJECTION INTRAVENOUS; SUBCUTANEOUS at 08:58

## 2019-06-12 RX ADMIN — ENOXAPARIN SODIUM 140 MILLIGRAM(S): 100 INJECTION SUBCUTANEOUS at 17:21

## 2019-06-12 RX ADMIN — Medication 175 MICROGRAM(S): at 05:40

## 2019-06-12 NOTE — DISCHARGE NOTE NURSING/CASE MANAGEMENT/SOCIAL WORK - NSDCDPATPORTLINK_GEN_ALL_CORE
You can access the [facility] Patient Portal, offered by [facility], by registering with the following website: [email or web address]

## 2019-06-12 NOTE — DISCHARGE NOTE PROVIDER - CARE PROVIDER_API CALL
Bib Awad (MD)  Cardiovascular Disease; Nuclear Cardiology  [address] [place] [address]  Phone: [phone]  Fax: [phone]  Follow Up Time:

## 2019-06-12 NOTE — DISCHARGE NOTE PROVIDER - HOSPITAL COURSE
Assessment:        pt is 52 yo female with PMH of left breast CA undergoing chemo at [place] with last chemo 6 days ago, GERD, HTN, chronic abd wound, HTN, HL here after noticing pain and swelling of LLE and seen in [place] office (Daughter works for Dr. Haro) with LE dopplers positive for DVT in LLE. now with b/l PE        # LLE DVT/bilateral PE - cardio input noted and ECHO reviewed with no evidence of RV strain    - admit to tele as per cardio recs    - pt on heparin drip, will continue.  decide on DOAC vs coumadin upon dc, will need to confer with dr. limon her heme/onc    - PVCs noted and will monitor and replete lytes        # HTN/HL - continue home meds with parameters.  normotensive. pt was takign wrong dose of benicar at home (40mg) recently chagned to candesartan 16-hctz 25 mg which is what she should be discharged on if Bps table for now will just continue verpamapil and restart meds in AM        # breast CA  - continue chemo as outpt with dr limon at [facility] [place]        # abd wound - result of plastic surgery for flap for breast pt gets dressing changed by family and was due for change today which likely explains slight leukocytosis     - wound care consult.      - outpt f/u with [facility]. solomon Monet        # hypothyroidism - continue synthroid    - check TSH         DVT proph- on heparin drip Vital Signs Last 24 Hrs    T(C): 37 (12 Jun 2019 10:26), Max: 37.2 (11 Jun 2019 19:49)    T(F): 98.6 (12 Jun 2019 10:26), Max: 99 (11 Jun 2019 19:49)    HR: 101 (12 Jun 2019 10:26) (101 - 118)    BP: 131/60 (12 Jun 2019 10:26) (101/55 - 131/60)    BP(mean): --    RR: 19 (12 Jun 2019 10:26) (17 - 19)    SpO2: 96% (12 Jun 2019 10:26) (96% - 99%)        HEENT:   pupils equal and reactive, EOMI, no oropharyngeal lesions, erythema, exudates, oral thrush        NECK:   supple, no carotid bruits, no palpable lymph nodes, no thyromegaly        CV:  +S1, +S2, regular, no murmurs or rubs        RESP:   lungs clear to auscultation bilaterally, no wheezing, rales, rhonchi, good air entry bilaterally        BREAST:  not examined        GI:  abdomen soft, non-tender, non-distended, normal BS, no bruits, no abdominal masses, no palpable masses        RECTAL:  not examined        :  not examined        MSK:   normal muscle tone, no atrophy, no rigidity, no contractions        EXT:   no clubbing, no cyanosis, no edema, no calf pain, swelling or erythema        VASCULAR:  pulses equal and symmetric in the upper and lower extremities        NEURO:  AAOX3, no focal neurological deficits, follows all commands, able to move extremities spontaneously        SKIN:  no ulcers, lesions or rashes        12 Jun 2019 07:59        138    |  104    |  15       ----------------------------<  124      3.7     |  28     |  0.80         Ca    8.9        12 Jun 2019 07:59    Mg     1.9       11 Jun 2019 11:04        TPro  6.3    /  Alb  2.9    /  TBili  0.8    /  DBili  x      /  AST  24     /  ALT  33     /  AlkPhos  119    12 Jun 2019 07:59    LIVER FUNCTIONS - ( 12 Jun 2019 07:59 )    Alb: 2.9 g/dL / Pro: 6.3 gm/dL / ALK PHOS: 119 U/L / ALT: 33 U/L / AST: 24 U/L / GGT: x           PT/INR - ( 11 Jun 2019 18:58 )   PT: 12.6 sec;   INR: 1.13 ratio               PTT - ( 12 Jun 2019 07:59 )  PTT:72.1 secCBC Full  -  ( 12 Jun 2019 07:59 )    WBC Count : 7.53 K/uL    Hemoglobin : 9.1 g/dL    Hematocrit : 28.5 %    Platelet Count - Automated : 215 K/uL    Mean Cell Volume : 85.8 fl    Mean Cell Hemoglobin : 27.4 pg    Mean Cell Hemoglobin Concentration : 31.9 gm/dL                Hospital course:            54 yo female with PMH of left breast CA undergoing chemo at [place] with last chemo 6 days ago, GERD, HTN, chronic abd wound([facility] dr. solomon Monet), HTN, HL here after noticing pain and swelling of LLE and seen in [place] office (Daughter works for Dr. Haro) with LE dopplers positive for DVT in LLE. now with b/l PE        # LLE DVT/bilateral PE -     -admitted for 24 hours of heparin drip    -patient now feeling improved, will be discharged on SC lovenox (as per her weight 135KG, patient does not qualify for Eliquis)

## 2019-06-12 NOTE — PHARMACOTHERAPY INTERVENTION NOTE - COMMENTS
Contacted patients pharmacy lovenox injection will be in stock 6/13 patients provided w/ 3 syringes to go home with.  Co-pay $0

## 2019-06-12 NOTE — DISCHARGE NOTE PROVIDER - NSDCCPCAREPLAN_GEN_ALL_CORE_FT
PRINCIPAL DISCHARGE DIAGNOSIS  Diagnosis: Acute pulmonary embolism without acute cor pulmonale, unspecified pulmonary embolism type  Assessment and Plan of Treatment: Start subcutaneous lovenox 140mg twice a day; follow up outpatient with primary care and hem/oncologist in one week

## 2019-06-12 NOTE — PROGRESS NOTE ADULT - SUBJECTIVE AND OBJECTIVE BOX
Patient is a 53y old  Female who presents with a chief complaint of sent from Dr. Garcia office with left LE DVT (12 Jun 2019 08:26)      6/12- denies CP or SOB   MEDICATIONS  (STANDING):  heparin  Infusion.  Unit(s)/Hr (24 mL/Hr) IV Continuous <Continuous>  levothyroxine 175 MICROGram(s) Oral daily  loratadine 10 milliGRAM(s) Oral daily  verapamil  milliGRAM(s) Oral daily    MEDICATIONS  (PRN):  aluminum hydroxide/magnesium hydroxide/simethicone Suspension 30 milliLiter(s) Oral every 4 hours PRN Dyspepsia  artificial tears (preservative free) Ophthalmic Solution 1 Drop(s) Both EYES two times a day PRN for dry eyes  heparin  Injectable 06116 Unit(s) IV Push every 6 hours PRN For aPTT less than 40  heparin  Injectable 5000 Unit(s) IV Push every 6 hours PRN For aPTT between 40 - 57  ibuprofen  Tablet. 400 milliGRAM(s) Oral every 6 hours PRN Mild Pain (1 - 3), Moderate Pain (4 - 6)  ondansetron Injectable 4 milliGRAM(s) IV Push every 6 hours PRN Nausea  senna 2 Tablet(s) Oral at bedtime PRN Constipation            Vital Signs Last 24 Hrs  T(C): 37.2 (11 Jun 2019 20:40), Max: 37.2 (11 Jun 2019 10:03)  T(F): 99 (11 Jun 2019 20:40), Max: 99 (11 Jun 2019 10:03)  HR: 118 (11 Jun 2019 20:40) (107 - 118)  BP: 126/68 (11 Jun 2019 20:40) (98/61 - 126/68)  BP(mean): --  RR: 17 (11 Jun 2019 20:40) (16 - 17)  SpO2: 97% (11 Jun 2019 20:40) (97% - 100%)            INTERPRETATION OF TELEMETRY:   SR ST occassional PVCs  ECG:        LABS:                        9.1    x     )-----------( 215      ( 12 Jun 2019 07:59 )             28.5     06-12    138  |  104  |  15  ----------------------------<  124<H>  3.7   |  28  |  0.80    Ca    8.9      12 Jun 2019 07:59  Mg     1.9     06-11    TPro  6.3  /  Alb  2.9<L>  /  TBili  0.8  /  DBili  x   /  AST  24  /  ALT  33  /  AlkPhos  119  06-12    CARDIAC MARKERS ( 11 Jun 2019 14:44 )  <0.015 ng/mL / x     / x     / x     / x      CARDIAC MARKERS ( 11 Jun 2019 11:04 )  <0.015 ng/mL / x     / x     / x     / x          PT/INR - ( 11 Jun 2019 18:58 )   PT: 12.6 sec;   INR: 1.13 ratio         PTT - ( 12 Jun 2019 07:59 )  PTT:72.1 sec    I&O's Summary    BNPSerum Pro-Brain Natriuretic Peptide: 108 pg/mL (06-11 @ 11:04)    RADIOLOGY & ADDITIONAL STUDIES:

## 2019-06-12 NOTE — PROGRESS NOTE ADULT - SUBJECTIVE AND OBJECTIVE BOX
CHIEF COMPLAINT:    SUBJECTIVE:     REVIEW OF SYSTEMS:    CONSTITUTIONAL: No weakness, fevers or chills  EYES/ENT: No visual changes;  No vertigo or throat pain   NECK: No pain or stiffness  RESPIRATORY: No cough, wheezing, hemoptysis; No shortness of breath  CARDIOVASCULAR: No chest pain or palpitations  GASTROINTESTINAL: No abdominal or epigastric pain. No nausea, vomiting, or hematemesis; No diarrhea or constipation. No melena or hematochezia.  GENITOURINARY: No dysuria, frequency or hematuria  NEUROLOGICAL: No numbness or weakness  SKIN: No itching, burning, rashes, or lesions   All other review of systems is negative unless indicated above    Vital Signs Last 24 Hrs  T(C): 37.2 (11 Jun 2019 20:40), Max: 37.2 (11 Jun 2019 10:03)  T(F): 99 (11 Jun 2019 20:40), Max: 99 (11 Jun 2019 10:03)  HR: 118 (11 Jun 2019 20:40) (107 - 118)  BP: 126/68 (11 Jun 2019 20:40) (98/61 - 126/68)  BP(mean): --  RR: 17 (11 Jun 2019 20:40) (16 - 17)  SpO2: 97% (11 Jun 2019 20:40) (97% - 100%)    I&O's Summary      CAPILLARY BLOOD GLUCOSE          PHYSICAL EXAM:    Constitutional: NAD, awake and alert, well-developed  HEENT: PERR, EOMI, Normal Hearing, MMM  Neck: Soft and supple, No LAD, No JVD  Respiratory: Breath sounds are clear bilaterally, No wheezing, rales or rhonchi  Cardiovascular: S1 and S2, regular rate and rhythm, no Murmurs, gallops or rubs  Gastrointestinal: Bowel Sounds present, soft, nontender, nondistended, no guarding, no rebound  Extremities: No peripheral edema  Vascular: 2+ peripheral pulses  Neurological: A/O x 3, no focal deficits  Musculoskeletal: 5/5 strength b/l upper and lower extremities  Skin: No rashes    MEDICATIONS:  MEDICATIONS  (STANDING):  heparin  Infusion.  Unit(s)/Hr (24 mL/Hr) IV Continuous <Continuous>  levothyroxine 175 MICROGram(s) Oral daily  loratadine 10 milliGRAM(s) Oral daily  verapamil  milliGRAM(s) Oral daily      LABS: All Labs Reviewed:                        9.1    x     )-----------( 215      ( 12 Jun 2019 07:59 )             28.5     06-11    137  |  105  |  20  ----------------------------<  104<H>  4.4   |  25  |  0.82    Ca    8.8      11 Jun 2019 11:04  Mg     1.9     06-11    TPro  6.8  /  Alb  3.0<L>  /  TBili  0.9  /  DBili  x   /  AST  34  /  ALT  40  /  AlkPhos  144<H>  06-11    PT/INR - ( 11 Jun 2019 18:58 )   PT: 12.6 sec;   INR: 1.13 ratio         PTT - ( 12 Jun 2019 01:24 )  PTT:74.5 sec  CARDIAC MARKERS ( 11 Jun 2019 14:44 )  <0.015 ng/mL / x     / x     / x     / x      CARDIAC MARKERS ( 11 Jun 2019 11:04 )  <0.015 ng/mL / x     / x     / x     / x            Assessment:    pt is 54 yo female with PMH of left breast CA undergoing chemo at [place] with last chemo 6 days ago, GERD, HTN, chronic abd wound, HTN, HL here after noticing pain and swelling of LLE and seen in [place] office (Daughter works for Dr. Haro) with LE dopplers positive for DVT in LLE. now with b/l PE    # LLE DVT/bilateral PE - cardio input noted and ECHO reviewed with no evidence of RV strain  - admit to tele as per cardio recs  - pt on heparin drip, will continue.  decide on DOAC vs coumadin upon dc, will need to confer with dr. limon her heme/onc  - PVCs noted and will monitor and replete lytes    # HTN/HL - continue home meds with parameters.  normotensive. pt was takign wrong dose of benicar at home (40mg) recently chagned to candesartan 16-hctz 25 mg which is what she should be discharged on if Bps table for now will just continue verpamapil and restart meds in AM    # breast CA  - continue chemo as outpt with dr limon at [facility] [place]    # abd wound - result of plastic surgery for flap for breast pt gets dressing changed by family and was due for change today which likely explains slight leukocytosis   - wound care consult.    - outpt f/u with [facility] plastics dr. solomon Monet    # hypothyroidism - continue synthroid  - check TSH     DVT proph- on heparin drip

## 2019-06-12 NOTE — PROGRESS NOTE ADULT - ASSESSMENT
1. SOB and left leg pain - bilateral pulmonary embolism. Reviewed stat 2 D echocardiogram.  no evidence of RV strain.  Iv heparin to be continued. eventually will need full AC , awaiting input form Heme.ONC to decide on which agent to use givne h/o breast CA and hypercoagulable state   tele shows no arrhytmias    2.Isolated premature ventricular contractions- close monitoring of the electrolytes.  Goal potassium of 4 and magnesium of 2.     3. HTN- continue home meds.   4. TSH elevated check full TFTs  Other medical issues- Management per primary team.   Thank you for allowing me to participate in the care of this patient. Please feel free to contact me with any questions.

## 2019-06-17 DIAGNOSIS — I82.402 ACUTE EMBOLISM AND THROMBOSIS OF UNSPECIFIED DEEP VEINS OF LEFT LOWER EXTREMITY: ICD-10-CM

## 2019-06-17 DIAGNOSIS — D72.829 ELEVATED WHITE BLOOD CELL COUNT, UNSPECIFIED: ICD-10-CM

## 2019-06-17 DIAGNOSIS — E03.9 HYPOTHYROIDISM, UNSPECIFIED: ICD-10-CM

## 2019-06-17 DIAGNOSIS — K21.9 GASTRO-ESOPHAGEAL REFLUX DISEASE WITHOUT ESOPHAGITIS: ICD-10-CM

## 2019-06-17 DIAGNOSIS — I49.3 VENTRICULAR PREMATURE DEPOLARIZATION: ICD-10-CM

## 2019-06-17 DIAGNOSIS — I10 ESSENTIAL (PRIMARY) HYPERTENSION: ICD-10-CM

## 2019-06-17 DIAGNOSIS — E78.5 HYPERLIPIDEMIA, UNSPECIFIED: ICD-10-CM

## 2019-06-17 DIAGNOSIS — C50.912 MALIGNANT NEOPLASM OF UNSPECIFIED SITE OF LEFT FEMALE BREAST: ICD-10-CM

## 2019-06-17 DIAGNOSIS — I26.99 OTHER PULMONARY EMBOLISM WITHOUT ACUTE COR PULMONALE: ICD-10-CM

## 2019-07-09 ENCOUNTER — RX RENEWAL (OUTPATIENT)
Age: 54
End: 2019-07-09

## 2019-07-29 ENCOUNTER — APPOINTMENT (OUTPATIENT)
Dept: PLASTIC SURGERY | Facility: CLINIC | Age: 54
End: 2019-07-29
Payer: COMMERCIAL

## 2019-07-29 PROCEDURE — 99024 POSTOP FOLLOW-UP VISIT: CPT

## 2019-10-07 NOTE — ED PROVIDER NOTE - OBJECTIVE STATEMENT
Left vm for pt to call office.    54 y/o F with PMHx of breast CA on chemo, GERD, HLD, HTN, sleep apnea, thyroid CA, subdural hematoma presenting to the ED c/o L leg pain and swelling x2 days. Pt was seen by MD this morning who did a doppler that showed a DVT behind L knee. Pt was then sent to ED for further care and evaluation. Last chemo treatment was 6 days ago. +generalized weakness. Denies fever, SOB, dizziness, or CP. Oncologist: chaz Mead. PMD: Dr. Haro. Pt is not anticoagulated.

## 2019-10-21 ENCOUNTER — APPOINTMENT (OUTPATIENT)
Dept: PLASTIC SURGERY | Facility: CLINIC | Age: 54
End: 2019-10-21
Payer: COMMERCIAL

## 2019-10-21 PROCEDURE — 99213 OFFICE O/P EST LOW 20 MIN: CPT

## 2019-10-22 ENCOUNTER — RESULT REVIEW (OUTPATIENT)
Age: 54
End: 2019-10-22

## 2019-11-25 ENCOUNTER — APPOINTMENT (OUTPATIENT)
Dept: PLASTIC SURGERY | Facility: CLINIC | Age: 54
End: 2019-11-25
Payer: COMMERCIAL

## 2019-11-25 PROCEDURE — 99214 OFFICE O/P EST MOD 30 MIN: CPT

## 2019-12-12 ENCOUNTER — OUTPATIENT (OUTPATIENT)
Dept: OUTPATIENT SERVICES | Facility: HOSPITAL | Age: 54
LOS: 1 days | End: 2019-12-12
Payer: COMMERCIAL

## 2019-12-12 ENCOUNTER — APPOINTMENT (OUTPATIENT)
Dept: ULTRASOUND IMAGING | Facility: CLINIC | Age: 54
End: 2019-12-12
Payer: COMMERCIAL

## 2019-12-12 DIAGNOSIS — Z98.89 OTHER SPECIFIED POSTPROCEDURAL STATES: Chronic | ICD-10-CM

## 2019-12-12 DIAGNOSIS — Z90.89 ACQUIRED ABSENCE OF OTHER ORGANS: Chronic | ICD-10-CM

## 2019-12-12 DIAGNOSIS — E04.1 NONTOXIC SINGLE THYROID NODULE: ICD-10-CM

## 2019-12-12 DIAGNOSIS — Z98.890 OTHER SPECIFIED POSTPROCEDURAL STATES: Chronic | ICD-10-CM

## 2019-12-12 PROCEDURE — 76536 US EXAM OF HEAD AND NECK: CPT | Mod: 26

## 2019-12-12 PROCEDURE — 76536 US EXAM OF HEAD AND NECK: CPT

## 2020-01-06 ENCOUNTER — APPOINTMENT (OUTPATIENT)
Dept: PLASTIC SURGERY | Facility: CLINIC | Age: 55
End: 2020-01-06
Payer: COMMERCIAL

## 2020-01-06 PROCEDURE — 99214 OFFICE O/P EST MOD 30 MIN: CPT

## 2020-01-06 NOTE — REASON FOR VISIT
[Follow-Up: _____] : a [unfilled] follow-up visit [FreeTextEntry1] : s/p bilateral breast reconstruction with YOAV flaps

## 2020-01-14 ENCOUNTER — OUTPATIENT (OUTPATIENT)
Dept: OUTPATIENT SERVICES | Facility: HOSPITAL | Age: 55
LOS: 1 days | End: 2020-01-14
Payer: COMMERCIAL

## 2020-01-14 VITALS
RESPIRATION RATE: 19 BRPM | SYSTOLIC BLOOD PRESSURE: 146 MMHG | WEIGHT: 293 LBS | HEIGHT: 66 IN | OXYGEN SATURATION: 100 % | TEMPERATURE: 98 F | DIASTOLIC BLOOD PRESSURE: 87 MMHG | HEART RATE: 97 BPM

## 2020-01-14 DIAGNOSIS — Z90.89 ACQUIRED ABSENCE OF OTHER ORGANS: Chronic | ICD-10-CM

## 2020-01-14 DIAGNOSIS — Z42.1 ENCOUNTER FOR BREAST RECONSTRUCTION FOLLOWING MASTECTOMY: ICD-10-CM

## 2020-01-14 DIAGNOSIS — Z98.890 OTHER SPECIFIED POSTPROCEDURAL STATES: Chronic | ICD-10-CM

## 2020-01-14 DIAGNOSIS — Z98.84 BARIATRIC SURGERY STATUS: Chronic | ICD-10-CM

## 2020-01-14 DIAGNOSIS — Z98.89 OTHER SPECIFIED POSTPROCEDURAL STATES: Chronic | ICD-10-CM

## 2020-01-14 DIAGNOSIS — Z90.13 ACQUIRED ABSENCE OF BILATERAL BREASTS AND NIPPLES: ICD-10-CM

## 2020-01-14 DIAGNOSIS — Z01.818 ENCOUNTER FOR OTHER PREPROCEDURAL EXAMINATION: ICD-10-CM

## 2020-01-14 DIAGNOSIS — Z90.13 ACQUIRED ABSENCE OF BILATERAL BREASTS AND NIPPLES: Chronic | ICD-10-CM

## 2020-01-14 LAB
ANION GAP SERPL CALC-SCNC: 6 MMOL/L — SIGNIFICANT CHANGE UP (ref 5–17)
APPEARANCE UR: ABNORMAL
APTT BLD: 30.3 SEC — SIGNIFICANT CHANGE UP (ref 27.5–36.3)
BASOPHILS # BLD AUTO: 0.07 K/UL — SIGNIFICANT CHANGE UP (ref 0–0.2)
BASOPHILS NFR BLD AUTO: 0.9 % — SIGNIFICANT CHANGE UP (ref 0–2)
BILIRUB UR-MCNC: NEGATIVE — SIGNIFICANT CHANGE UP
BUN SERPL-MCNC: 15 MG/DL — SIGNIFICANT CHANGE UP (ref 7–23)
CALCIUM SERPL-MCNC: 8.9 MG/DL — SIGNIFICANT CHANGE UP (ref 8.5–10.1)
CHLORIDE SERPL-SCNC: 105 MMOL/L — SIGNIFICANT CHANGE UP (ref 96–108)
CO2 SERPL-SCNC: 26 MMOL/L — SIGNIFICANT CHANGE UP (ref 22–31)
COLOR SPEC: YELLOW — SIGNIFICANT CHANGE UP
CREAT SERPL-MCNC: 0.82 MG/DL — SIGNIFICANT CHANGE UP (ref 0.5–1.3)
DIFF PNL FLD: ABNORMAL
EOSINOPHIL # BLD AUTO: 0.2 K/UL — SIGNIFICANT CHANGE UP (ref 0–0.5)
EOSINOPHIL NFR BLD AUTO: 2.6 % — SIGNIFICANT CHANGE UP (ref 0–6)
GLUCOSE SERPL-MCNC: 112 MG/DL — HIGH (ref 70–99)
GLUCOSE UR QL: NEGATIVE MG/DL — SIGNIFICANT CHANGE UP
HCT VFR BLD CALC: 39.6 % — SIGNIFICANT CHANGE UP (ref 34.5–45)
HGB BLD-MCNC: 12.1 G/DL — SIGNIFICANT CHANGE UP (ref 11.5–15.5)
IMM GRANULOCYTES NFR BLD AUTO: 0.4 % — SIGNIFICANT CHANGE UP (ref 0–1.5)
INR BLD: 1.14 RATIO — SIGNIFICANT CHANGE UP (ref 0.88–1.16)
KETONES UR-MCNC: NEGATIVE — SIGNIFICANT CHANGE UP
LEUKOCYTE ESTERASE UR-ACNC: ABNORMAL
LYMPHOCYTES # BLD AUTO: 1.71 K/UL — SIGNIFICANT CHANGE UP (ref 1–3.3)
LYMPHOCYTES # BLD AUTO: 21.9 % — SIGNIFICANT CHANGE UP (ref 13–44)
MCHC RBC-ENTMCNC: 23.1 PG — LOW (ref 27–34)
MCHC RBC-ENTMCNC: 30.6 GM/DL — LOW (ref 32–36)
MCV RBC AUTO: 75.6 FL — LOW (ref 80–100)
MONOCYTES # BLD AUTO: 0.82 K/UL — SIGNIFICANT CHANGE UP (ref 0–0.9)
MONOCYTES NFR BLD AUTO: 10.5 % — SIGNIFICANT CHANGE UP (ref 2–14)
NEUTROPHILS # BLD AUTO: 4.98 K/UL — SIGNIFICANT CHANGE UP (ref 1.8–7.4)
NEUTROPHILS NFR BLD AUTO: 63.7 % — SIGNIFICANT CHANGE UP (ref 43–77)
NITRITE UR-MCNC: NEGATIVE — SIGNIFICANT CHANGE UP
PH UR: 5 — SIGNIFICANT CHANGE UP (ref 5–8)
PLATELET # BLD AUTO: 314 K/UL — SIGNIFICANT CHANGE UP (ref 150–400)
POTASSIUM SERPL-MCNC: 3.9 MMOL/L — SIGNIFICANT CHANGE UP (ref 3.5–5.3)
POTASSIUM SERPL-SCNC: 3.9 MMOL/L — SIGNIFICANT CHANGE UP (ref 3.5–5.3)
PROT UR-MCNC: 15 MG/DL
PROTHROM AB SERPL-ACNC: 12.7 SEC — SIGNIFICANT CHANGE UP (ref 10–12.9)
RBC # BLD: 5.24 M/UL — HIGH (ref 3.8–5.2)
RBC # FLD: 16.9 % — HIGH (ref 10.3–14.5)
SODIUM SERPL-SCNC: 137 MMOL/L — SIGNIFICANT CHANGE UP (ref 135–145)
SP GR SPEC: 1.02 — SIGNIFICANT CHANGE UP (ref 1.01–1.02)
UROBILINOGEN FLD QL: 1 MG/DL
WBC # BLD: 7.81 K/UL — SIGNIFICANT CHANGE UP (ref 3.8–10.5)
WBC # FLD AUTO: 7.81 K/UL — SIGNIFICANT CHANGE UP (ref 3.8–10.5)

## 2020-01-14 PROCEDURE — 86900 BLOOD TYPING SEROLOGIC ABO: CPT

## 2020-01-14 PROCEDURE — G0463: CPT | Mod: 25

## 2020-01-14 PROCEDURE — 71046 X-RAY EXAM CHEST 2 VIEWS: CPT

## 2020-01-14 PROCEDURE — 85610 PROTHROMBIN TIME: CPT

## 2020-01-14 PROCEDURE — 86850 RBC ANTIBODY SCREEN: CPT

## 2020-01-14 PROCEDURE — 85025 COMPLETE CBC W/AUTO DIFF WBC: CPT

## 2020-01-14 PROCEDURE — 80048 BASIC METABOLIC PNL TOTAL CA: CPT

## 2020-01-14 PROCEDURE — 36415 COLL VENOUS BLD VENIPUNCTURE: CPT

## 2020-01-14 PROCEDURE — 86901 BLOOD TYPING SEROLOGIC RH(D): CPT

## 2020-01-14 PROCEDURE — 71046 X-RAY EXAM CHEST 2 VIEWS: CPT | Mod: 26

## 2020-01-14 PROCEDURE — 85730 THROMBOPLASTIN TIME PARTIAL: CPT

## 2020-01-14 PROCEDURE — 81001 URINALYSIS AUTO W/SCOPE: CPT

## 2020-01-14 RX ORDER — LIDOCAINE AND PRILOCAINE CREAM 25; 25 MG/G; MG/G
1 CREAM TOPICAL
Qty: 0 | Refills: 0 | DISCHARGE

## 2020-01-14 RX ORDER — OLANZAPINE 15 MG/1
1 TABLET, FILM COATED ORAL
Qty: 0 | Refills: 0 | DISCHARGE

## 2020-01-14 RX ORDER — DEXAMETHASONE 0.5 MG/5ML
1 ELIXIR ORAL
Qty: 0 | Refills: 0 | DISCHARGE

## 2020-01-14 RX ORDER — LORATADINE 10 MG/1
1 TABLET ORAL
Qty: 0 | Refills: 0 | DISCHARGE

## 2020-01-14 RX ORDER — LEVOTHYROXINE SODIUM 125 MCG
1 TABLET ORAL
Qty: 0 | Refills: 0 | DISCHARGE

## 2020-01-14 RX ORDER — ERGOCALCIFEROL 1.25 MG/1
1 CAPSULE ORAL
Qty: 0 | Refills: 0 | DISCHARGE

## 2020-01-14 NOTE — H&P PST ADULT - NSICDXPASTMEDICALHX_GEN_ALL_CORE_FT
PAST MEDICAL HISTORY:  Anemia     Breast cancer left    DVT of leg (deep venous thrombosis) left    Esophageal dysmotility due to to lap band    GERD (gastroesophageal reflux disease)     Hay fever     History of mixed hyperlipidemia     Hypertension     Hypothyroidism     Metabolic syndrome     Neuropathy     Obesity     Peripheral venous insufficiency left leg    Phlebitis most left leg    Polycystic ovary disease     Pulmonary embolism bilateral 2019 May    Sleep apnea no cpap    Subdural hematoma, nontraumatic 2/2013    Thyroid cancer surgery Decembe 1992    Vascular insufficiency lower extremities PAST MEDICAL HISTORY:  Anemia     Breast cancer left    DVT of leg (deep venous thrombosis) left    Esophageal dysmotility due to to lap band    GERD (gastroesophageal reflux disease)     Hay fever     History of mixed hyperlipidemia     Hypertension     Hypothyroidism     Metabolic syndrome     Neuropathy     Obesity     Peripheral venous insufficiency left leg    Phlebitis most left leg    Polycystic ovary disease     Pulmonary embolism bilateral 2019 May    Sleep apnea no cpap    Subdural hematoma, nontraumatic 2/2013    Thyroid cancer surgery Dec 1992    Vascular insufficiency lower extremities

## 2020-01-14 NOTE — H&P PST ADULT - ASSESSMENT
Plan  55 y/o female with history of breast cancer, S/P b/l mastectomy with YOAV flap in 12/2018. Scheduled for Revision of bilateral breast reconstruction and abdominoplasty donor site.   1. Stop all NSAIDS, herbal supplements and vitamins for 7 days.  2. NPO at midnight.  3. Take the following medications ( levothyroxine ) with small sips of water on the morning of your procedure/surgery.  4. Use EZ sponges as directed  5. Labs, EKG, CXR as per surgeon  6. PMD visit for optimization prior to surgery as per surgeon

## 2020-01-14 NOTE — H&P PST ADULT - HISTORY OF PRESENT ILLNESS
55 y/o female with history of breast cancer, S/P b/l mastectomy with YOAV flap in 12/2018. Pt reports left breast discomfort and abdominal scar irregulariry. Scheduled for Revision of bilateral breast reconstruction and abdominoplasty donor site.

## 2020-01-14 NOTE — H&P PST ADULT - TEACHING/LEARNING LEARNING PREFERENCES
individual instruction/written material/group instruction/video/skill demonstration/pictorial/verbal instruction

## 2020-01-14 NOTE — H&P PST ADULT - NSICDXPASTSURGICALHX_GEN_ALL_CORE_FT
PAST SURGICAL HISTORY:   delivery NOS 3 c- sections 3/87, ,     H/O bilateral mastectomy with YOAV flap 2018 Dec    H/O prior ablation treatment left leg    History of tonsillectomy     S/P evacuation of subdural hematoma 13 craniotomy    S/P hysterectomy 11/17/10- FARHAN/BSO- polycystic ovaries    S/P lumpectomy, left breast     S/P tonsillectomy     Thyroid cancer Thyroidectomy  for thyroid cancer PAST SURGICAL HISTORY:   delivery NOS 3 c- sections 3/87, ,     H/O bilateral mastectomy with YOAV flap 2018 Dec    H/O laparoscopic adjustable gastric banding band and port removed    H/O prior ablation treatment left leg    History of tonsillectomy     S/P evacuation of subdural hematoma 13 craniotomy    S/P hysterectomy 11/17/10- FARHAN/BSO- polycystic ovaries    S/P lumpectomy, left breast     S/P tonsillectomy     Thyroid cancer Thyroidectomy  for thyroid cancer

## 2020-01-14 NOTE — H&P PST ADULT - NSANTHOSAYNRD_GEN_A_CORE
No. GABBI screening performed.  STOP BANG Legend: 0-2 = LOW Risk; 3-4 = INTERMEDIATE Risk; 5-8 = HIGH Risk

## 2020-01-14 NOTE — H&P PST ADULT - NSICDXFAMILYHX_GEN_ALL_CORE_FT
FAMILY HISTORY:  Family history of cerebrovascular accident (CVA) in mother    Father  Still living? No  Family history of aortic aneurysm, Age at diagnosis: Age Unknown    Mother  Still living? Yes, Estimated age: 81-90  Family history of hypertension, Age at diagnosis: Age Unknown  Family history of macular degeneration, Age at diagnosis: Age Unknown    Sibling  Still living? Yes, Estimated age: 61-70  Family history of breast cancer in male, Age at diagnosis: Age Unknown

## 2020-01-15 DIAGNOSIS — Z01.818 ENCOUNTER FOR OTHER PREPROCEDURAL EXAMINATION: ICD-10-CM

## 2020-01-15 DIAGNOSIS — Z42.1 ENCOUNTER FOR BREAST RECONSTRUCTION FOLLOWING MASTECTOMY: ICD-10-CM

## 2020-01-15 DIAGNOSIS — Z90.13 ACQUIRED ABSENCE OF BILATERAL BREASTS AND NIPPLES: ICD-10-CM

## 2020-01-17 RX ORDER — SODIUM CHLORIDE 9 MG/ML
1000 INJECTION, SOLUTION INTRAVENOUS
Refills: 0 | Status: DISCONTINUED | OUTPATIENT
Start: 2020-01-21 | End: 2020-01-21

## 2020-01-17 RX ORDER — HYDROMORPHONE HYDROCHLORIDE 2 MG/ML
0.5 INJECTION INTRAMUSCULAR; INTRAVENOUS; SUBCUTANEOUS
Refills: 0 | Status: DISCONTINUED | OUTPATIENT
Start: 2020-01-21 | End: 2020-01-21

## 2020-01-17 RX ORDER — FENTANYL CITRATE 50 UG/ML
50 INJECTION INTRAVENOUS
Refills: 0 | Status: DISCONTINUED | OUTPATIENT
Start: 2020-01-21 | End: 2020-01-21

## 2020-01-21 ENCOUNTER — OUTPATIENT (OUTPATIENT)
Dept: INPATIENT UNIT | Facility: HOSPITAL | Age: 55
LOS: 1 days | Discharge: HOME | End: 2020-01-21
Payer: COMMERCIAL

## 2020-01-21 ENCOUNTER — RESULT REVIEW (OUTPATIENT)
Age: 55
End: 2020-01-21

## 2020-01-21 VITALS
TEMPERATURE: 98 F | SYSTOLIC BLOOD PRESSURE: 139 MMHG | OXYGEN SATURATION: 95 % | RESPIRATION RATE: 16 BRPM | HEART RATE: 91 BPM | DIASTOLIC BLOOD PRESSURE: 81 MMHG

## 2020-01-21 VITALS
HEART RATE: 90 BPM | DIASTOLIC BLOOD PRESSURE: 53 MMHG | SYSTOLIC BLOOD PRESSURE: 119 MMHG | OXYGEN SATURATION: 95 % | WEIGHT: 293 LBS | TEMPERATURE: 98 F | HEIGHT: 66 IN | RESPIRATION RATE: 16 BRPM

## 2020-01-21 DIAGNOSIS — Z42.1 ENCOUNTER FOR BREAST RECONSTRUCTION FOLLOWING MASTECTOMY: ICD-10-CM

## 2020-01-21 DIAGNOSIS — Z90.13 ACQUIRED ABSENCE OF BILATERAL BREASTS AND NIPPLES: ICD-10-CM

## 2020-01-21 DIAGNOSIS — Z98.890 OTHER SPECIFIED POSTPROCEDURAL STATES: Chronic | ICD-10-CM

## 2020-01-21 DIAGNOSIS — Z90.89 ACQUIRED ABSENCE OF OTHER ORGANS: Chronic | ICD-10-CM

## 2020-01-21 DIAGNOSIS — Z98.89 OTHER SPECIFIED POSTPROCEDURAL STATES: Chronic | ICD-10-CM

## 2020-01-21 DIAGNOSIS — Z90.13 ACQUIRED ABSENCE OF BILATERAL BREASTS AND NIPPLES: Chronic | ICD-10-CM

## 2020-01-21 DIAGNOSIS — Z98.84 BARIATRIC SURGERY STATUS: Chronic | ICD-10-CM

## 2020-01-21 PROCEDURE — 88305 TISSUE EXAM BY PATHOLOGIST: CPT | Mod: 26

## 2020-01-21 PROCEDURE — 15877 SUCTION LIPECTOMY TRUNK: CPT

## 2020-01-21 PROCEDURE — 22901 EXC ABDL TUM DEEP 5 CM/>: CPT

## 2020-01-21 PROCEDURE — 19366: CPT | Mod: LT

## 2020-01-21 PROCEDURE — 13101 CMPLX RPR TRUNK 2.6-7.5 CM: CPT | Mod: 59

## 2020-01-21 PROCEDURE — 88305 TISSUE EXAM BY PATHOLOGIST: CPT

## 2020-01-21 PROCEDURE — 19366: CPT | Mod: RT

## 2020-01-21 RX ORDER — SODIUM CHLORIDE 9 MG/ML
1000 INJECTION, SOLUTION INTRAVENOUS
Refills: 0 | Status: DISCONTINUED | OUTPATIENT
Start: 2020-01-21 | End: 2020-01-21

## 2020-01-21 RX ORDER — OXYCODONE HYDROCHLORIDE 5 MG/1
5 TABLET ORAL EVERY 6 HOURS
Refills: 0 | Status: DISCONTINUED | OUTPATIENT
Start: 2020-01-21 | End: 2020-01-21

## 2020-01-21 RX ORDER — OXYCODONE HYDROCHLORIDE 5 MG/1
10 TABLET ORAL ONCE
Refills: 0 | Status: DISCONTINUED | OUTPATIENT
Start: 2020-01-21 | End: 2020-01-21

## 2020-01-21 RX ADMIN — FENTANYL CITRATE 50 MICROGRAM(S): 50 INJECTION INTRAVENOUS at 15:59

## 2020-01-21 RX ADMIN — OXYCODONE HYDROCHLORIDE 10 MILLIGRAM(S): 5 TABLET ORAL at 16:01

## 2020-01-21 NOTE — BRIEF OPERATIVE NOTE - NSICDXBRIEFPREOP_GEN_ALL_CORE_FT
PRE-OP DIAGNOSIS:  History of breast cancer 21-Jan-2020 15:53:46  Geri Iqbal  Absence of both breasts 21-Jan-2020 15:52:52  Geri Iqbal

## 2020-01-21 NOTE — ASU PATIENT PROFILE, ADULT - PMH
Anemia    Breast cancer  left  DVT of leg (deep venous thrombosis)  left  Esophageal dysmotility  due to to lap band  GERD (gastroesophageal reflux disease)    Hay fever    History of mixed hyperlipidemia    Hypertension    Hypothyroidism    Metabolic syndrome    Neuropathy    Obesity    Peripheral venous insufficiency  left leg  Phlebitis  most left leg  Polycystic ovary disease    Pulmonary embolism  bilateral 2019 May  Sleep apnea  no cpap  Subdural hematoma, nontraumatic  2/2013  Thyroid cancer  surgery Dec 1992  Vascular insufficiency  lower extremities

## 2020-01-21 NOTE — ASU DISCHARGE PLAN (ADULT/PEDIATRIC) - CARE PROVIDER_API CALL
Guero Monet; SYDNEY)  Otolaryngology; Plastic Surgery  [address] [place] [address] [address]  [place] [address]  Phone: [phone]  Fax: [phone]  Follow Up Time:

## 2020-01-21 NOTE — ASU PATIENT PROFILE, ADULT - TEACHING/LEARNING LEARNING PREFERENCES
verbal instruction/group instruction/video/written material/individual instruction/pictorial/skill demonstration

## 2020-01-21 NOTE — ASU PATIENT PROFILE, ADULT - PSH
delivery NOS  3 c- sections 3/87, ,   H/O bilateral mastectomy  with YOAV flap 2018 Dec  H/O laparoscopic adjustable gastric banding  band and port removed  H/O prior ablation treatment  left leg  History of tonsillectomy    S/P evacuation of subdural hematoma  13 craniotomy  S/P hysterectomy  11/17/10- FARHAN/BSO- polycystic ovaries  S/P lumpectomy, left breast    S/P tonsillectomy    Thyroid cancer  Thyroidectomy  for thyroid cancer

## 2020-01-23 PROBLEM — G62.9 POLYNEUROPATHY, UNSPECIFIED: Chronic | Status: ACTIVE | Noted: 2020-01-14

## 2020-01-23 PROBLEM — D64.9 ANEMIA, UNSPECIFIED: Chronic | Status: ACTIVE | Noted: 2020-01-14

## 2020-01-23 PROBLEM — E66.9 OBESITY, UNSPECIFIED: Chronic | Status: ACTIVE | Noted: 2020-01-14

## 2020-01-23 PROBLEM — I80.9 PHLEBITIS AND THROMBOPHLEBITIS OF UNSPECIFIED SITE: Chronic | Status: ACTIVE | Noted: 2020-01-14

## 2020-01-23 PROBLEM — I82.409 ACUTE EMBOLISM AND THROMBOSIS OF UNSPECIFIED DEEP VEINS OF UNSPECIFIED LOWER EXTREMITY: Chronic | Status: ACTIVE | Noted: 2020-01-14

## 2020-01-23 PROBLEM — I99.8 OTHER DISORDER OF CIRCULATORY SYSTEM: Chronic | Status: ACTIVE | Noted: 2020-01-14

## 2020-01-23 PROBLEM — E03.9 HYPOTHYROIDISM, UNSPECIFIED: Chronic | Status: ACTIVE | Noted: 2020-01-14

## 2020-01-23 PROBLEM — I26.99 OTHER PULMONARY EMBOLISM WITHOUT ACUTE COR PULMONALE: Chronic | Status: ACTIVE | Noted: 2020-01-14

## 2020-01-27 ENCOUNTER — APPOINTMENT (OUTPATIENT)
Dept: PLASTIC SURGERY | Facility: CLINIC | Age: 55
End: 2020-01-27
Payer: COMMERCIAL

## 2020-01-27 PROCEDURE — 99024 POSTOP FOLLOW-UP VISIT: CPT

## 2020-01-28 DIAGNOSIS — N64.1 FAT NECROSIS OF BREAST: ICD-10-CM

## 2020-01-28 DIAGNOSIS — Z90.13 ACQUIRED ABSENCE OF BILATERAL BREASTS AND NIPPLES: ICD-10-CM

## 2020-01-28 DIAGNOSIS — N60.31 FIBROSCLEROSIS OF RIGHT BREAST: ICD-10-CM

## 2020-01-28 DIAGNOSIS — N60.32 FIBROSCLEROSIS OF LEFT BREAST: ICD-10-CM

## 2020-01-28 DIAGNOSIS — I10 ESSENTIAL (PRIMARY) HYPERTENSION: ICD-10-CM

## 2020-01-28 DIAGNOSIS — N65.1 DISPROPORTION OF RECONSTRUCTED BREAST: ICD-10-CM

## 2020-01-28 DIAGNOSIS — L90.5 SCAR CONDITIONS AND FIBROSIS OF SKIN: ICD-10-CM

## 2020-01-28 DIAGNOSIS — C50.912 MALIGNANT NEOPLASM OF UNSPECIFIED SITE OF LEFT FEMALE BREAST: ICD-10-CM

## 2020-01-28 DIAGNOSIS — M95.8 OTHER SPECIFIED ACQUIRED DEFORMITIES OF MUSCULOSKELETAL SYSTEM: ICD-10-CM

## 2020-01-28 DIAGNOSIS — E03.9 HYPOTHYROIDISM, UNSPECIFIED: ICD-10-CM

## 2020-01-30 ENCOUNTER — APPOINTMENT (OUTPATIENT)
Dept: PLASTIC SURGERY | Facility: CLINIC | Age: 55
End: 2020-01-30

## 2020-02-12 ENCOUNTER — APPOINTMENT (OUTPATIENT)
Dept: PLASTIC SURGERY | Facility: CLINIC | Age: 55
End: 2020-02-12
Payer: COMMERCIAL

## 2020-02-12 PROCEDURE — 99024 POSTOP FOLLOW-UP VISIT: CPT

## 2020-02-13 NOTE — REASON FOR VISIT
[Follow-Up: _____] : a [unfilled] follow-up visit [Family Member] : family member [Spouse] : spouse [FreeTextEntry1] : s/p revision of bilateral breast reconstruction and abdominal donor site 1/21/20, hx bilateral breast reconstruction with YOAV flaps

## 2020-02-26 ENCOUNTER — APPOINTMENT (OUTPATIENT)
Dept: PLASTIC SURGERY | Facility: CLINIC | Age: 55
End: 2020-02-26
Payer: COMMERCIAL

## 2020-02-26 PROCEDURE — 99024 POSTOP FOLLOW-UP VISIT: CPT

## 2020-02-28 NOTE — REASON FOR VISIT
[Post Op: _________] : a [unfilled] post op visit [Spouse] : spouse [Family Member] : family member [FreeTextEntry1] : s/p revision of bilateral breast  reconstruction and abdominal donor site 1/21/20

## 2020-04-06 ENCOUNTER — APPOINTMENT (OUTPATIENT)
Dept: PLASTIC SURGERY | Facility: CLINIC | Age: 55
End: 2020-04-06

## 2021-01-11 ENCOUNTER — APPOINTMENT (OUTPATIENT)
Dept: PLASTIC SURGERY | Facility: CLINIC | Age: 56
End: 2021-01-11

## 2021-04-02 NOTE — ASU PREOP CHECKLIST - ADVANCE DIRECTIVE ADDRESSED/READDRESSED
done

## 2022-04-26 NOTE — ED PROVIDER NOTE - INTERPRETATION
SUBJECTIVE:  New patient to [facility]    Melissa GIULIANA Cramer is a 5 year old female who presents to the office with the CC of chest pain.    Was at school, normal activity when developed intermittent chest pain.  This was brief but recurrent, sent to school nurse who contact parents due to irregular and slow heart rate.    Denies any recent illness  Healthy child    No family history of cardiac problems     No past medical history on file.    No current outpatient medications on file.    Social History     Tobacco Use     Smoking status: Never Smoker     Smokeless tobacco: Never Used   Substance Use Topics     Alcohol use: Not on file       ROS:Review of systems negative except as stated above.    EXAM:  BP 90/58 (BP Location: Right arm, Patient Position: Sitting, Cuff Size: Adult Small)   Pulse 78   Temp 97.6  F (36.4  C) (Tympanic)   Wt 26.8 kg (59 lb 1 oz)   SpO2 100%   GENERAL APPEARANCE: healthy, alert and no distress  EYES: EOMI,  PERRL, conjunctiva clear  RESP: lungs clear to auscultation - no rales, rhonchi or wheezes  CV: regular rates and rhythm, normal S1 S2, no murmur noted     Office EKG demonstrates:  appears normal, rate 77, occasional PAC, no acute ST/T changes c/w ischemia,     ASSESSMENT/PLAN:  (R07.89) Atypical chest pain  (primary encounter diagnosis)  Plan: EKG 12-lead complete w/read - Clinics            Patient appears well, vitals otherwise normal.  Reviewed that given atypical presentation of chest pain, that further evaluation thru primary provider warranted.  Patient has appointment tomorrow and recommend to keep appointment.  Okay for tylenol and ibuprofen for now and monitor symptoms    Follow up with primary provider tomorrow    Malcolm Robin MD  April 26, 2022 7:10 PM              
abnormal

## 2022-11-10 NOTE — ASU PATIENT PROFILE, ADULT - NS PRO AD INFO GIVEN Y
Subjective:     Patient ID: Georgia Garcia is a 42 y.o. female.    Chief Complaint: Pain of the Left Knee and Pain of the Right Knee      HPI: Patient is being seen for bilateral knee pain that has been present for years, but recently worsened over the last couple of weeks. Rating pain 7/10 at today's visit. Takes Naproxen that she received from her PCP, Dr. Muse, twice a day to help with pain relief. Has not participated in physical therapy.  Prefers to take Tylenol arthritis throughout the day.      Complains of radiating pain from the left low back buttock region down the back of the leg to the calf area and sometimes feels numb as well.  Bilateral knee pain not associated with swelling or locking but 7/10 pain on a daily basis and affects ADLs and ability to walk.  Visco in  was helpful.    Past Medical History:   Diagnosis Date    Anemia     Cervical radiculopathy     COVID-19 virus infection 2021    Diverticulosis     Hypermenorrhea     Hypertension     Insomnia     Internal hemorrhoid     Migraine headache     Morbid obesity     Rectal bleeding     Uterine fibroid      Past Surgical History:   Procedure Laterality Date     SECTION, LOW TRANSVERSE      x1    COLONOSCOPY N/A 2022    Procedure: COLONOSCOPY;  Surgeon: Maximus Lopes MD;  Location: [facility];  Service: Endoscopy;  Laterality: N/A;    TOTAL ABDOMINAL HYSTERECTOMY  12    TUBAL LIGATION       Family History   Problem Relation Age of Onset    Hypertension Mother     Pulmonary embolism Mother     Deep vein thrombosis Mother     Thrombophilia Mother     Heart failure Mother     Pulmonary embolism Maternal Uncle     Deep vein thrombosis Maternal Uncle     Thrombophilia Maternal Uncle     Colon cancer Maternal Uncle     COPD Father     Diabetes Maternal Grandfather     Stroke Maternal Grandfather     Breast cancer Maternal Aunt     Lung cancer Paternal Uncle     Breast cancer Maternal Grandmother     Stroke Maternal  Grandmother     ADD / ADHD Daughter      Social History     Socioeconomic History    Marital status: Single   Occupational History     Employer: [employer]   Tobacco Use    Smoking status: Never    Smokeless tobacco: Never   Substance and Sexual Activity    Alcohol use: No    Drug use: No    Sexual activity: Yes     Partners: Male     Birth control/protection: Surgical     Comment: hyst; mut monog   Social History Narrative    She is , has one daughter and 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              Social Determinants of Health     Financial Resource Strain: Unknown    Difficulty of Paying Living Expenses: Patient refused   Food Insecurity: No Food Insecurity    Worried About Running Out of Food in the Last Year: Never true    Ran Out of Food in the Last Year: Never true   Transportation Needs: No Transportation Needs    Lack of Transportation (Medical): No    Lack of Transportation (Non-Medical): No   Physical Activity: Insufficiently Active    Days of Exercise per Week: 2 days    Minutes of Exercise per Session: 30 min   Stress: No Stress Concern Present    Feeling of Stress : Only a little   Social Connections: Unknown    Frequency of Communication with Friends and Family: More than three times a week    Frequency of Social Gatherings with Friends and Family: More than three times a week    Active Member of Clubs or Organizations: Yes    Attends Club or Organization Meetings: 1 to 4 times per year    Marital Status: Patient refused   Housing Stability: Low Risk     Unable to Pay for Housing in the Last Year: No    Number of Places Lived in the Last Year: 1    Unstable Housing in the Last Year: No       Current Outpatient Medications:     ALPRAZolam (XANAX) 0.25 MG tablet, TAKE 1 TO 2 TABLETS BY MOUTH THREE TIMES DAILY AS NEEDED FOR ANXIETY, Disp: 90 tablet, Rfl: 3    azelastine (ASTELIN) 137 mcg (0.1 %) nasal spray, SMARTSIG:Both Nares, Disp: ,  Rfl:     butalbital-acetaminophen-caffeine -40 mg (FIORICET, ESGIC) -40 mg per tablet, Take 1-2 tablets every 6 hr as needed for headache.  Not to exceed 6 tablets in 24 hr., Disp: 30 tablet, Rfl: 2    cyclobenzaprine (FLEXERIL) 10 MG tablet, Take 1 tablet (10 mg total) by mouth 3 (three) times daily as needed for Muscle spasms., Disp: 30 tablet, Rfl: 1    EScitalopram oxalate (LEXAPRO) 20 MG tablet, TAKE 1 TABLET(20 MG) BY MOUTH EVERY DAY, Disp: 30 tablet, Rfl: 11    hydroCHLOROthiazide (HYDRODIURIL) 12.5 MG Tab, Take 1 tablet (12.5 mg total) by mouth once daily., Disp: 30 tablet, Rfl: 11    HYDROcodone-acetaminophen (NORCO) 7.5-325 mg per tablet, Take 1 tablet by mouth every 6 (six) hours as needed for Pain., Disp: 20 tablet, Rfl: 0    losartan (COZAAR) 100 MG tablet, TAKE 1 TABLET BY MOUTH EVERY DAY, Disp: 90 tablet, Rfl: 0    meclizine (ANTIVERT) 25 mg tablet, Take 1-2 tablets (25-50 mg total) by mouth every 4 to 6 hours as needed for Dizziness., Disp: 30 tablet, Rfl: 1    metoprolol succinate (TOPROL-XL) 50 MG 24 hr tablet, TAKE 1 TABLET(50 MG) BY MOUTH EVERY DAY, Disp: 30 tablet, Rfl: 11    metroNIDAZOLE (FLAGYL) 500 MG tablet, Take 1 tablet (500 mg total) by mouth every 12 (twelve) hours. for 7 days, Disp: 14 tablet, Rfl: 0    naproxen (NAPROSYN) 500 MG tablet, Take 1 tablet (500 mg total) by mouth 2 (two) times daily with meals., Disp: 30 tablet, Rfl: 0    NIFEdipine (PROCARDIA-XL) 60 MG (OSM) 24 hr tablet, TAKE 1 TABLET(60 MG) BY MOUTH EVERY DAY, Disp: 30 tablet, Rfl: 2    predniSONE (DELTASONE) 20 MG tablet, Take 2 tablets daily for 3 days, then 1 tablet daily for 3 days, then 1/2 tablet daily for 2 days., Disp: 10 tablet, Rfl: 0    rizatriptan (MAXALT) 10 MG tablet, Take 1 tablet (10 mg total) by mouth as needed., Disp: 6 tablet, Rfl: 11    topiramate (TOPAMAX) 50 MG tablet, Take 1 tablet (50 mg total) by mouth 2 (two) times daily., Disp: 60 tablet, Rfl: 11    buPROPion (WELLBUTRIN) 100 MG  tablet, Take 1 tablet (100 mg total) by mouth 2 (two) times daily., Disp: 60 tablet, Rfl: 11    zolpidem (AMBIEN) 5 MG Tab, Take 1 tablet (5 mg total) by mouth nightly as needed., Disp: 30 tablet, Rfl: 5  Review of patient's allergies indicates:  No Known Allergies  Review of Systems   Constitutional:  Negative for chills, fever and weight loss.   Respiratory:  Negative for shortness of breath.    Cardiovascular:  Negative for chest pain and palpitations.     Objective:   Body mass index is 55.82 kg/m².  There were no vitals filed for this visit.        Ortho/SPM Exam-alert and oriented well-nourished well-developed ambulatory with antalgic gait in no acute distress     Respiratory effort is normal     Bilateral knees-no acute deformity or swelling but chronic bony enlargement noted  Range of motion 0-100 degrees with crepitus  Stable joints with negative Lachman's  Strength is 4/5   Neurovascular intact     Mild tenderness to palpation of left lower lumbar and upper buttock region   Negative straight leg raise at 90° bilateral  Patient unable to perform figure 4 movement    Psychiatric good affect and cognition    Plan-physical therapy for left-sided sciatica and bilateral knee pain  Order bilateral viscosupplementation series of worn    Continue Tylenol    Patient Instructions   MEDICAL NECESSITY FOR VISCOSUPPLEMENTATION: After thorough evaluation of the patient, I have determined that visco-supplementation is medically necessary. The patient has painful DJD of the knee with failure of conservative therapy including lifestyle modifications and rehabilitation exercises. Oral analgesis/NSAIDs have not adequately controlled symptoms and there is radiographic evidence of joint space narrowing, subchondral sclerosis, and some early osteophytic changes, or in lack of radiographic evidence, there is arthroscopic or other evidence of chondrosis.  Kellgren- Sarkis grade 2 or greater.  Bilateral and desires Synvisc  1Patient Education       Viscosupplementation   Why is this procedure done?   Your joints are where two bones meet. The ends of the bones are covered with a protective coating of cartilage. This helps them glide smoothly during movement. A fluid also helps the joint move more smoothly. With years of use, the cartilage may begin to wear away. This causes pain in the joints. This procedure is done to relieve the pain. A special fluid is used to help the bones glide more easily. It also acts like a shock absorber. This is most often done on the knee joints.  What will the results be?   Lubricates the joint  Less pain in the joints during movement or weight bearing  Improved joint mobility or movement  What happens before the procedure?   Your doctor may ask you to try other ways to treat osteoarthritis or OA, such as exercise, physical therapy, drugs for pain, applying hot compress, and losing weight.  Talk to your doctor about all the drugs you are taking. Be sure to include all prescription and over-the-counter (OTC) drugs, and herbal supplements. Tell the doctor about any drug allergy. Bring a list of drugs you take with you. Some drugs may interact with the injection.  What happens during the procedure?   Your doctor will clean the skin area where the injection will be given. You will be given a drug called local anesthesia. This will numb your skin and you will not feel any pain.  In some cases, your doctor might use imaging like x-ray or ultrasound to make sure they inject the right spot.  If you have excess fluid in your joint, your doctor may remove the fluid before beginning.  A needle will be used to inject the hyaluronic acid into the joint. Hyaluronic acid is a natural fluid in your body. It lubricates the joint to ease body movements.  The doctor will cover the injection site with a small bandage to prevent infection.  The procedure may only take a couple of minutes.  What happens after the procedure?    You may feel slight pain, warmth, and swelling around the joint area.  You will be able to go home after the injection.  What care is needed at home?   Ask your doctor what you need to do when you go home. Make sure you ask questions if you do not understand what the doctor says. This way you will know what you need to do.  Place an ice pack or a bag of frozen peas wrapped in a towel over the painful part. Never put ice right on the skin. Do not leave the ice on more than 10 to 15 minutes at a time.  Rest for the first few days after the procedure. Avoid strenuous activities like heavy lifting, jogging, standing for a long time, and hard exercise.  What follow-up care is needed?   Your doctor may ask you to make visits to the office to check on your progress. Be sure to keep these visits. Your doctor may want you to have more injections.  What lifestyle changes are needed?   Ask your doctor about when you can go back to your normal activities like exercise or work.  What problems could happen?   Pain, redness, and swelling around the injection site  Infection of the joint  Fever  Allergic reaction  Itching  Rash  Bruising  Bleeding in the joint  No change in pain after injection  Where can I learn more?   [employer]  http://orthoinfo.aaos.org/topic.cfm?sthin=n77834   Last Reviewed Date   2021-03-30  Consumer Information Use and Disclaimer   This information is not specific medical advice and does not replace information you receive from your health care provider. This is only a brief summary of general information. It does NOT include all information about conditions, illnesses, injuries, tests, procedures, treatments, therapies, discharge instructions or life-style choices that may apply to you. You must talk with your health care provider for complete information about your health and treatment options. This information should not be used to decide whether or not to accept your  health care providers advice, instructions or recommendations. Only your health care provider has the knowledge and training to provide advice that is right for you.  Copyright   Copyright © 2021 UpToDate, Inc. and its affiliates and/or licensors. All rights reserved.    IMAGING: X-ray Knee Ortho Bilateral with Flexion  Narrative: EXAMINATION:  XR KNEE ORTHO BILAT WITH FLEXION    CLINICAL HISTORY:  Pain in right knee    TECHNIQUE:  AP standing of both knees, PA flexion standing views of both knees, and Merchant views of both knees were performed.  Lateral views of both knees were also performed.    COMPARISON  11/05/2021    FINDINGS:  There is equivocal mild joint space narrowing seen involving the medial compartment of either knee.  Mild degenerative change seen at either patellofemoral compartment.  No joint effusions are suggested.  No acute fracture or dislocation.  Impression: 1.  As above    Electronically signed by: Rell Ward DO  Date:    11/10/2022  Time:    10:26       Radiographs / Imaging : Relevant imaging results reviewed by me and interpreted by me, discussed with the patient and / or family- agree      Assessment:     Encounter Diagnoses   Name Primary?    Primary osteoarthritis of both knees Yes    Left sided sciatica         Plan:   Primary osteoarthritis of both knees    Left sided sciatica      The patient verbalized good understanding of the medical issues discussed today and expressed appreciation for the care provided.  Patient was given the opportunity to ask questions and be an active participant in their medical care. Patient had no further questions or concerns at this time.     The patient was encouraged to participate in appropriate physical activity.      Jules Valadez M.D.  [facility]        This note was dictated using voice recognition software and may have sound a like errors.       yes

## 2022-11-17 ENCOUNTER — OUTPATIENT (OUTPATIENT)
Dept: OUTPATIENT SERVICES | Facility: HOSPITAL | Age: 57
LOS: 1 days | End: 2022-11-17
Payer: COMMERCIAL

## 2022-11-17 ENCOUNTER — APPOINTMENT (OUTPATIENT)
Dept: ULTRASOUND IMAGING | Facility: CLINIC | Age: 57
End: 2022-11-17
Payer: COMMERCIAL

## 2022-11-17 DIAGNOSIS — Z98.89 OTHER SPECIFIED POSTPROCEDURAL STATES: Chronic | ICD-10-CM

## 2022-11-17 DIAGNOSIS — Z98.890 OTHER SPECIFIED POSTPROCEDURAL STATES: Chronic | ICD-10-CM

## 2022-11-17 DIAGNOSIS — Z90.13 ACQUIRED ABSENCE OF BILATERAL BREASTS AND NIPPLES: Chronic | ICD-10-CM

## 2022-11-17 DIAGNOSIS — Z90.89 ACQUIRED ABSENCE OF OTHER ORGANS: Chronic | ICD-10-CM

## 2022-11-17 DIAGNOSIS — C73 MALIGNANT NEOPLASM OF THYROID GLAND: ICD-10-CM

## 2022-11-17 DIAGNOSIS — Z00.8 ENCOUNTER FOR OTHER GENERAL EXAMINATION: ICD-10-CM

## 2022-11-17 DIAGNOSIS — Z98.84 BARIATRIC SURGERY STATUS: Chronic | ICD-10-CM

## 2022-11-17 PROCEDURE — 76536 US EXAM OF HEAD AND NECK: CPT

## 2022-11-17 PROCEDURE — 76536 US EXAM OF HEAD AND NECK: CPT | Mod: 26

## 2023-03-07 ENCOUNTER — OUTPATIENT (OUTPATIENT)
Dept: OUTPATIENT SERVICES | Facility: HOSPITAL | Age: 58
LOS: 1 days | Discharge: HOME | End: 2023-03-07
Payer: COMMERCIAL

## 2023-03-07 VITALS
OXYGEN SATURATION: 97 % | HEIGHT: 66 IN | SYSTOLIC BLOOD PRESSURE: 127 MMHG | DIASTOLIC BLOOD PRESSURE: 79 MMHG | TEMPERATURE: 98 F | RESPIRATION RATE: 22 BRPM | HEART RATE: 84 BPM | WEIGHT: 265 LBS

## 2023-03-07 DIAGNOSIS — Z90.89 ACQUIRED ABSENCE OF OTHER ORGANS: Chronic | ICD-10-CM

## 2023-03-07 DIAGNOSIS — Z12.11 ENCOUNTER FOR SCREENING FOR MALIGNANT NEOPLASM OF COLON: ICD-10-CM

## 2023-03-07 DIAGNOSIS — Z98.890 OTHER SPECIFIED POSTPROCEDURAL STATES: Chronic | ICD-10-CM

## 2023-03-07 DIAGNOSIS — Z98.84 BARIATRIC SURGERY STATUS: Chronic | ICD-10-CM

## 2023-03-07 DIAGNOSIS — Z98.89 OTHER SPECIFIED POSTPROCEDURAL STATES: Chronic | ICD-10-CM

## 2023-03-07 DIAGNOSIS — Z90.13 ACQUIRED ABSENCE OF BILATERAL BREASTS AND NIPPLES: Chronic | ICD-10-CM

## 2023-03-07 PROCEDURE — 88305 TISSUE EXAM BY PATHOLOGIST: CPT

## 2023-03-07 PROCEDURE — 88305 TISSUE EXAM BY PATHOLOGIST: CPT | Mod: 26

## 2023-03-07 RX ORDER — VERAPAMIL HCL 240 MG
1 CAPSULE, EXTENDED RELEASE PELLETS 24 HR ORAL
Qty: 0 | Refills: 0 | DISCHARGE

## 2023-03-07 RX ORDER — APIXABAN 2.5 MG/1
1 TABLET, FILM COATED ORAL
Qty: 0 | Refills: 0 | DISCHARGE

## 2023-03-07 RX ORDER — ERGOCALCIFEROL 1.25 MG/1
1 CAPSULE ORAL
Qty: 0 | Refills: 0 | DISCHARGE

## 2023-03-07 RX ORDER — METFORMIN HYDROCHLORIDE 850 MG/1
1 TABLET ORAL
Qty: 0 | Refills: 0 | DISCHARGE

## 2023-03-07 RX ORDER — CANDESARTAN CILEXETIL AND HYDROCHLOROTHIAZIDE 32; 12.5 MG/1; MG/1
1 TABLET ORAL
Qty: 0 | Refills: 0 | DISCHARGE

## 2023-03-07 RX ORDER — LETROZOLE 2.5 MG/1
1 TABLET, FILM COATED ORAL
Qty: 0 | Refills: 0 | DISCHARGE

## 2023-03-07 RX ORDER — LEVOTHYROXINE SODIUM 125 MCG
1 TABLET ORAL
Qty: 0 | Refills: 0 | DISCHARGE

## 2023-03-07 NOTE — ASU PATIENT PROFILE, ADULT - FALL HARM RISK - UNIVERSAL INTERVENTIONS
Bed in lowest position, wheels locked, appropriate side rails in place/Call bell, personal items and telephone in reach/Instruct patient to call for assistance before getting out of bed or chair/Non-slip footwear when patient is out of bed/Tiff to call system/Physically safe environment - no spills, clutter or unnecessary equipment/Purposeful Proactive Rounding/Room/bathroom lighting operational, light cord in reach

## 2023-03-07 NOTE — ASU PATIENT PROFILE, ADULT - NSICDXPASTMEDICALHX_GEN_ALL_CORE_FT
PAST MEDICAL HISTORY:  Anemia     Breast cancer left    DVT of leg (deep venous thrombosis) left    Esophageal dysmotility due to to lap band    GERD (gastroesophageal reflux disease)     Hay fever     History of mixed hyperlipidemia     Hypertension     Hypothyroidism     Metabolic syndrome     Neuropathy     Obesity     Peripheral venous insufficiency left leg    Phlebitis most left leg    Polycystic ovary disease     Pulmonary embolism bilateral 2019 May    Sleep apnea no cpap    Subdural hematoma, nontraumatic 2/2013    Thyroid cancer surgery Dec 1992    Vascular insufficiency lower extremities

## 2023-03-07 NOTE — ASU PATIENT PROFILE, ADULT - NSICDXPASTSURGICALHX_GEN_ALL_CORE_FT
PAST SURGICAL HISTORY:   delivery NOS 3 c- sections 3/87, ,     H/O bilateral mastectomy with YOAV flap 2018 Dec    H/O laparoscopic adjustable gastric banding band and port removed    H/O prior ablation treatment left leg    History of tonsillectomy     S/P evacuation of subdural hematoma 13 craniotomy    S/P hysterectomy 11/17/10- FARHAN/BSO- polycystic ovaries    S/P lumpectomy, left breast     S/P tonsillectomy     Thyroid cancer Thyroidectomy  for thyroid cancer

## 2023-03-08 LAB — SURGICAL PATHOLOGY STUDY: SIGNIFICANT CHANGE UP

## 2023-03-11 DIAGNOSIS — Z12.11 ENCOUNTER FOR SCREENING FOR MALIGNANT NEOPLASM OF COLON: ICD-10-CM

## 2023-03-11 DIAGNOSIS — Z92.3 PERSONAL HISTORY OF IRRADIATION: ICD-10-CM

## 2023-03-11 DIAGNOSIS — E03.9 HYPOTHYROIDISM, UNSPECIFIED: ICD-10-CM

## 2023-03-11 DIAGNOSIS — Z85.3 PERSONAL HISTORY OF MALIGNANT NEOPLASM OF BREAST: ICD-10-CM

## 2023-03-11 DIAGNOSIS — Z85.850 PERSONAL HISTORY OF MALIGNANT NEOPLASM OF THYROID: ICD-10-CM

## 2023-03-11 DIAGNOSIS — Z90.710 ACQUIRED ABSENCE OF BOTH CERVIX AND UTERUS: ICD-10-CM

## 2023-03-11 DIAGNOSIS — Z92.21 PERSONAL HISTORY OF ANTINEOPLASTIC CHEMOTHERAPY: ICD-10-CM

## 2023-03-11 DIAGNOSIS — E11.9 TYPE 2 DIABETES MELLITUS WITHOUT COMPLICATIONS: ICD-10-CM

## 2023-03-11 DIAGNOSIS — I10 ESSENTIAL (PRIMARY) HYPERTENSION: ICD-10-CM

## 2023-03-11 DIAGNOSIS — Z79.84 LONG TERM (CURRENT) USE OF ORAL HYPOGLYCEMIC DRUGS: ICD-10-CM

## 2023-03-11 DIAGNOSIS — D12.3 BENIGN NEOPLASM OF TRANSVERSE COLON: ICD-10-CM

## 2023-03-11 DIAGNOSIS — Z90.13 ACQUIRED ABSENCE OF BILATERAL BREASTS AND NIPPLES: ICD-10-CM

## 2023-03-11 DIAGNOSIS — K64.8 OTHER HEMORRHOIDS: ICD-10-CM

## 2023-10-19 ENCOUNTER — NON-APPOINTMENT (OUTPATIENT)
Age: 58
End: 2023-10-19

## 2023-10-20 ENCOUNTER — APPOINTMENT (OUTPATIENT)
Dept: BREAST CENTER | Facility: CLINIC | Age: 58
End: 2023-10-20
Payer: COMMERCIAL

## 2023-10-20 VITALS — HEIGHT: 66 IN | WEIGHT: 255 LBS | RESPIRATION RATE: 16 BRPM | BODY MASS INDEX: 40.98 KG/M2

## 2023-10-20 DIAGNOSIS — C50.112 MALIGNANT NEOPLASM OF CENTRAL PORTION OF LEFT FEMALE BREAST: ICD-10-CM

## 2023-10-20 PROCEDURE — 76642 ULTRASOUND BREAST LIMITED: CPT

## 2023-10-20 PROCEDURE — 99213 OFFICE O/P EST LOW 20 MIN: CPT | Mod: 25

## 2023-10-20 RX ORDER — TIRZEPATIDE 5 MG/.5ML
5 INJECTION, SOLUTION SUBCUTANEOUS
Refills: 0 | Status: ACTIVE | COMMUNITY

## 2023-10-20 RX ORDER — VERAPAMIL HYDROCHLORIDE 180 MG/1
180 CAPSULE, DELAYED RELEASE PELLETS ORAL
Refills: 0 | Status: ACTIVE | COMMUNITY

## 2023-10-20 RX ORDER — SULFAMETHOXAZOLE AND TRIMETHOPRIM 800; 160 MG/1; MG/1
800-160 TABLET ORAL TWICE DAILY
Qty: 6 | Refills: 0 | Status: DISCONTINUED | COMMUNITY
Start: 2020-01-16 | End: 2023-10-01

## 2023-10-20 RX ORDER — OXYCODONE AND ACETAMINOPHEN 5; 325 MG/1; MG/1
5-325 TABLET ORAL
Qty: 15 | Refills: 0 | Status: DISCONTINUED | COMMUNITY
Start: 2020-01-06 | End: 2023-10-01

## 2023-10-20 RX ORDER — ASPIRIN 81 MG/1
81 TABLET ORAL
Qty: 21 | Refills: 0 | Status: DISCONTINUED | COMMUNITY
Start: 2018-12-18 | End: 2023-10-01

## 2023-10-20 RX ORDER — ERGOCALCIFEROL 1.25 MG/1
1.25 MG CAPSULE, LIQUID FILLED ORAL
Refills: 0 | Status: ACTIVE | COMMUNITY

## 2023-10-20 RX ORDER — METFORMIN HYDROCHLORIDE 500 MG/1
500 TABLET, COATED ORAL
Refills: 0 | Status: ACTIVE | COMMUNITY

## 2023-10-20 RX ORDER — LETROZOLE TABLETS 2.5 MG/1
2.5 TABLET, FILM COATED ORAL
Refills: 0 | Status: ACTIVE | COMMUNITY

## 2023-10-20 RX ORDER — CANDESARTAN CILEXETIL AND HYDROCHLOROTHIAZIDE 16; 12.5 MG/1; MG/1
16-12.5 TABLET ORAL
Refills: 0 | Status: ACTIVE | COMMUNITY

## 2023-10-20 RX ORDER — OXYCODONE AND ACETAMINOPHEN 5; 325 MG/1; MG/1
5-325 TABLET ORAL
Qty: 30 | Refills: 0 | Status: DISCONTINUED | COMMUNITY
Start: 2018-12-18 | End: 2023-10-01

## 2023-10-20 RX ORDER — LEVOTHYROXINE SODIUM 137 UG/1
TABLET ORAL
Refills: 0 | Status: ACTIVE | COMMUNITY

## 2023-10-24 PROBLEM — C50.112 MALIGNANT NEOPLASM OF CENTRAL PORTION OF LEFT FEMALE BREAST, UNSPECIFIED ESTROGEN RECEPTOR STATUS: Status: ACTIVE | Noted: 2018-12-10

## 2024-04-19 ENCOUNTER — APPOINTMENT (OUTPATIENT)
Dept: BREAST CENTER | Facility: CLINIC | Age: 59
End: 2024-04-19
Payer: COMMERCIAL

## 2024-04-19 VITALS
BODY MASS INDEX: 39.7 KG/M2 | WEIGHT: 247 LBS | SYSTOLIC BLOOD PRESSURE: 124 MMHG | RESPIRATION RATE: 16 BRPM | HEIGHT: 66 IN | DIASTOLIC BLOOD PRESSURE: 78 MMHG

## 2024-04-19 DIAGNOSIS — Z90.13 ACQUIRED ABSENCE OF BILATERAL BREASTS AND NIPPLES: ICD-10-CM

## 2024-04-19 PROCEDURE — 99213 OFFICE O/P EST LOW 20 MIN: CPT | Mod: 25

## 2024-04-19 PROCEDURE — 76642 ULTRASOUND BREAST LIMITED: CPT

## 2024-07-26 NOTE — H&P ADULT - NSHPSOURCEINFORD_GEN_ALL_CORE
07/26/24 0205   Provider Notification   Provider Name/Title Dr Bean   Method of Notification Electronic Page   Request Evaluate-Remote   Notification Reason Medication Request     MD paged for pt request of bedtime dose of lexapro 20mg. MD to put in orders   Chart(s)/Patient

## 2024-10-21 ENCOUNTER — APPOINTMENT (OUTPATIENT)
Dept: BREAST CENTER | Facility: CLINIC | Age: 59
End: 2024-10-21
Payer: COMMERCIAL

## 2024-10-21 VITALS — HEIGHT: 66 IN | BODY MASS INDEX: 40.18 KG/M2 | WEIGHT: 250 LBS | RESPIRATION RATE: 16 BRPM

## 2024-10-21 DIAGNOSIS — Z90.13 ACQUIRED ABSENCE OF BILATERAL BREASTS AND NIPPLES: ICD-10-CM

## 2024-10-21 PROCEDURE — 99213 OFFICE O/P EST LOW 20 MIN: CPT | Mod: 25

## 2024-10-21 PROCEDURE — 76641 ULTRASOUND BREAST COMPLETE: CPT | Mod: 50

## 2025-04-05 ENCOUNTER — NON-APPOINTMENT (OUTPATIENT)
Age: 60
End: 2025-04-05

## 2025-04-07 ENCOUNTER — APPOINTMENT (OUTPATIENT)
Dept: BREAST CENTER | Facility: CLINIC | Age: 60
End: 2025-04-07
Payer: COMMERCIAL

## 2025-04-07 DIAGNOSIS — Z90.13 ACQUIRED ABSENCE OF BILATERAL BREASTS AND NIPPLES: ICD-10-CM

## 2025-04-07 PROCEDURE — 76641 ULTRASOUND BREAST COMPLETE: CPT | Mod: 50

## 2025-04-07 PROCEDURE — 99213 OFFICE O/P EST LOW 20 MIN: CPT | Mod: 25

## 2025-09-03 ENCOUNTER — INPATIENT (INPATIENT)
Facility: HOSPITAL | Age: 60
LOS: 2 days | Discharge: HOME | DRG: 446 | End: 2025-09-06
Attending: SURGERY | Admitting: SURGERY
Payer: COMMERCIAL

## 2025-09-03 VITALS
HEART RATE: 128 BPM | RESPIRATION RATE: 20 BRPM | WEIGHT: 264.78 LBS | OXYGEN SATURATION: 98 % | SYSTOLIC BLOOD PRESSURE: 117 MMHG | DIASTOLIC BLOOD PRESSURE: 85 MMHG | TEMPERATURE: 98 F

## 2025-09-03 DIAGNOSIS — Z90.89 ACQUIRED ABSENCE OF OTHER ORGANS: Chronic | ICD-10-CM

## 2025-09-03 DIAGNOSIS — Z98.890 OTHER SPECIFIED POSTPROCEDURAL STATES: Chronic | ICD-10-CM

## 2025-09-03 DIAGNOSIS — K81.0 ACUTE CHOLECYSTITIS: ICD-10-CM

## 2025-09-03 DIAGNOSIS — Z98.84 BARIATRIC SURGERY STATUS: Chronic | ICD-10-CM

## 2025-09-03 DIAGNOSIS — Z90.13 ACQUIRED ABSENCE OF BILATERAL BREASTS AND NIPPLES: Chronic | ICD-10-CM

## 2025-09-03 DIAGNOSIS — Z98.89 OTHER SPECIFIED POSTPROCEDURAL STATES: Chronic | ICD-10-CM

## 2025-09-03 LAB
ALBUMIN SERPL ELPH-MCNC: 3.1 G/DL — LOW (ref 3.3–5)
ALP SERPL-CCNC: 43 U/L — SIGNIFICANT CHANGE UP (ref 40–120)
ALT FLD-CCNC: 25 U/L — SIGNIFICANT CHANGE UP (ref 12–78)
ANION GAP SERPL CALC-SCNC: 5 MMOL/L — SIGNIFICANT CHANGE UP (ref 5–17)
AST SERPL-CCNC: 14 U/L — LOW (ref 15–37)
BASE EXCESS BLDV CALC-SCNC: -0.2 MMOL/L — SIGNIFICANT CHANGE UP (ref -2–3)
BASOPHILS # BLD AUTO: 0.07 K/UL — SIGNIFICANT CHANGE UP (ref 0–0.2)
BASOPHILS NFR BLD AUTO: 0.5 % — SIGNIFICANT CHANGE UP (ref 0–2)
BILIRUB SERPL-MCNC: 0.3 MG/DL — SIGNIFICANT CHANGE UP (ref 0.2–1.2)
BUN SERPL-MCNC: 15 MG/DL — SIGNIFICANT CHANGE UP (ref 7–23)
CALCIUM SERPL-MCNC: 7.8 MG/DL — LOW (ref 8.5–10.1)
CHLORIDE SERPL-SCNC: 110 MMOL/L — HIGH (ref 96–108)
CO2 SERPL-SCNC: 25 MMOL/L — SIGNIFICANT CHANGE UP (ref 22–31)
CREAT SERPL-MCNC: 0.86 MG/DL — SIGNIFICANT CHANGE UP (ref 0.5–1.3)
EGFR: 76 ML/MIN/1.73M2 — SIGNIFICANT CHANGE UP
EGFR: 76 ML/MIN/1.73M2 — SIGNIFICANT CHANGE UP
EOSINOPHIL # BLD AUTO: 0.1 K/UL — SIGNIFICANT CHANGE UP (ref 0–0.5)
EOSINOPHIL NFR BLD AUTO: 0.7 % — SIGNIFICANT CHANGE UP (ref 0–6)
GAS PNL BLDV: SIGNIFICANT CHANGE UP
GLUCOSE BLDC GLUCOMTR-MCNC: 115 MG/DL — HIGH (ref 70–99)
GLUCOSE SERPL-MCNC: 120 MG/DL — HIGH (ref 70–99)
HCO3 BLDV-SCNC: 26 MMOL/L — SIGNIFICANT CHANGE UP (ref 22–29)
HCT VFR BLD CALC: 47.7 % — HIGH (ref 34.5–45)
HGB BLD-MCNC: 15.1 G/DL — SIGNIFICANT CHANGE UP (ref 11.5–15.5)
IMM GRANULOCYTES # BLD AUTO: 0.05 K/UL — SIGNIFICANT CHANGE UP (ref 0–0.07)
IMM GRANULOCYTES NFR BLD AUTO: 0.4 % — SIGNIFICANT CHANGE UP (ref 0–0.9)
LACTATE SERPL-SCNC: 1.9 MMOL/L — SIGNIFICANT CHANGE UP (ref 0.7–2)
LACTATE SERPL-SCNC: 2.5 MMOL/L — HIGH (ref 0.7–2)
LIDOCAIN IGE QN: 34 U/L — SIGNIFICANT CHANGE UP (ref 13–75)
LYMPHOCYTES # BLD AUTO: 1.19 K/UL — SIGNIFICANT CHANGE UP (ref 1–3.3)
LYMPHOCYTES NFR BLD AUTO: 8.8 % — LOW (ref 13–44)
MAGNESIUM SERPL-MCNC: 1.6 MG/DL — SIGNIFICANT CHANGE UP (ref 1.6–2.6)
MCHC RBC-ENTMCNC: 26.1 PG — LOW (ref 27–34)
MCHC RBC-ENTMCNC: 31.7 G/DL — LOW (ref 32–36)
MCV RBC AUTO: 82.5 FL — SIGNIFICANT CHANGE UP (ref 80–100)
MONOCYTES # BLD AUTO: 0.93 K/UL — HIGH (ref 0–0.9)
MONOCYTES NFR BLD AUTO: 6.9 % — SIGNIFICANT CHANGE UP (ref 2–14)
NEUTROPHILS # BLD AUTO: 11.22 K/UL — HIGH (ref 1.8–7.4)
NEUTROPHILS NFR BLD AUTO: 82.7 % — HIGH (ref 43–77)
NRBC # BLD AUTO: 0 K/UL — SIGNIFICANT CHANGE UP (ref 0–0)
NRBC # FLD: 0 K/UL — SIGNIFICANT CHANGE UP (ref 0–0)
NRBC BLD AUTO-RTO: 0 /100 WBCS — SIGNIFICANT CHANGE UP (ref 0–0)
NT-PROBNP SERPL-SCNC: 63 PG/ML — SIGNIFICANT CHANGE UP (ref 0–125)
PCO2 BLDV: 47 MMHG — HIGH (ref 39–42)
PH BLDV: 7.35 — SIGNIFICANT CHANGE UP (ref 7.32–7.43)
PLATELET # BLD AUTO: 319 K/UL — SIGNIFICANT CHANGE UP (ref 150–400)
PMV BLD: 10.3 FL — SIGNIFICANT CHANGE UP (ref 7–13)
PO2 BLDV: 56 MMHG — HIGH (ref 25–45)
POTASSIUM SERPL-MCNC: 3.4 MMOL/L — LOW (ref 3.5–5.3)
POTASSIUM SERPL-SCNC: 3.4 MMOL/L — LOW (ref 3.5–5.3)
PROT SERPL-MCNC: 6.4 GM/DL — SIGNIFICANT CHANGE UP (ref 6–8.3)
RBC # BLD: 5.78 M/UL — HIGH (ref 3.8–5.2)
RBC # FLD: 14.6 % — HIGH (ref 10.3–14.5)
SAO2 % BLDV: 88 % — SIGNIFICANT CHANGE UP (ref 67–88)
SODIUM SERPL-SCNC: 140 MMOL/L — SIGNIFICANT CHANGE UP (ref 135–145)
TROPONIN I, HIGH SENSITIVITY RESULT: 18.8 NG/L — SIGNIFICANT CHANGE UP
WBC # BLD: 13.56 K/UL — HIGH (ref 3.8–10.5)
WBC # FLD AUTO: 13.56 K/UL — HIGH (ref 3.8–10.5)

## 2025-09-03 PROCEDURE — 83735 ASSAY OF MAGNESIUM: CPT

## 2025-09-03 PROCEDURE — 86900 BLOOD TYPING SEROLOGIC ABO: CPT

## 2025-09-03 PROCEDURE — 93306 TTE W/DOPPLER COMPLETE: CPT

## 2025-09-03 PROCEDURE — 85027 COMPLETE CBC AUTOMATED: CPT

## 2025-09-03 PROCEDURE — 85610 PROTHROMBIN TIME: CPT

## 2025-09-03 PROCEDURE — 80076 HEPATIC FUNCTION PANEL: CPT

## 2025-09-03 PROCEDURE — 36415 COLL VENOUS BLD VENIPUNCTURE: CPT

## 2025-09-03 PROCEDURE — S2900: CPT

## 2025-09-03 PROCEDURE — 71045 X-RAY EXAM CHEST 1 VIEW: CPT | Mod: 26

## 2025-09-03 PROCEDURE — 93017 CV STRESS TEST TRACING ONLY: CPT

## 2025-09-03 PROCEDURE — 86850 RBC ANTIBODY SCREEN: CPT

## 2025-09-03 PROCEDURE — 84100 ASSAY OF PHOSPHORUS: CPT

## 2025-09-03 PROCEDURE — 83036 HEMOGLOBIN GLYCOSYLATED A1C: CPT

## 2025-09-03 PROCEDURE — C1889: CPT

## 2025-09-03 PROCEDURE — 86901 BLOOD TYPING SEROLOGIC RH(D): CPT

## 2025-09-03 PROCEDURE — 85730 THROMBOPLASTIN TIME PARTIAL: CPT

## 2025-09-03 PROCEDURE — 80053 COMPREHEN METABOLIC PANEL: CPT

## 2025-09-03 PROCEDURE — 88304 TISSUE EXAM BY PATHOLOGIST: CPT

## 2025-09-03 PROCEDURE — 82962 GLUCOSE BLOOD TEST: CPT

## 2025-09-03 PROCEDURE — 93010 ELECTROCARDIOGRAM REPORT: CPT

## 2025-09-03 PROCEDURE — 80048 BASIC METABOLIC PNL TOTAL CA: CPT

## 2025-09-03 PROCEDURE — 76705 ECHO EXAM OF ABDOMEN: CPT | Mod: 26

## 2025-09-03 PROCEDURE — 99285 EMERGENCY DEPT VISIT HI MDM: CPT

## 2025-09-03 RX ORDER — ONDANSETRON HCL/PF 4 MG/2 ML
4 VIAL (ML) INJECTION ONCE
Refills: 0 | Status: COMPLETED | OUTPATIENT
Start: 2025-09-03 | End: 2025-09-03

## 2025-09-03 RX ORDER — HYDROMORPHONE/SOD CHLOR,ISO/PF 2 MG/10 ML
0.5 SYRINGE (ML) INJECTION EVERY 6 HOURS
Refills: 0 | Status: DISCONTINUED | OUTPATIENT
Start: 2025-09-03 | End: 2025-09-06

## 2025-09-03 RX ORDER — ACETAMINOPHEN 500 MG/5ML
975 LIQUID (ML) ORAL EVERY 6 HOURS
Refills: 0 | Status: DISCONTINUED | OUTPATIENT
Start: 2025-09-03 | End: 2025-09-06

## 2025-09-03 RX ORDER — DEXTROSE 50 % IN WATER 50 %
25 SYRINGE (ML) INTRAVENOUS ONCE
Refills: 0 | Status: DISCONTINUED | OUTPATIENT
Start: 2025-09-03 | End: 2025-09-06

## 2025-09-03 RX ORDER — DEXTROSE 50 % IN WATER 50 %
15 SYRINGE (ML) INTRAVENOUS ONCE
Refills: 0 | Status: DISCONTINUED | OUTPATIENT
Start: 2025-09-03 | End: 2025-09-06

## 2025-09-03 RX ORDER — ENOXAPARIN SODIUM 100 MG/ML
40 INJECTION SUBCUTANEOUS EVERY 24 HOURS
Refills: 0 | Status: DISCONTINUED | OUTPATIENT
Start: 2025-09-03 | End: 2025-09-04

## 2025-09-03 RX ORDER — SODIUM CHLORIDE 9 G/1000ML
1000 INJECTION, SOLUTION INTRAVENOUS
Refills: 0 | Status: DISCONTINUED | OUTPATIENT
Start: 2025-09-03 | End: 2025-09-06

## 2025-09-03 RX ORDER — SODIUM CHLORIDE 9 G/1000ML
1000 INJECTION, SOLUTION INTRAVENOUS
Refills: 0 | Status: DISCONTINUED | OUTPATIENT
Start: 2025-09-03 | End: 2025-09-04

## 2025-09-03 RX ORDER — DEXTROSE 50 % IN WATER 50 %
12.5 SYRINGE (ML) INTRAVENOUS ONCE
Refills: 0 | Status: DISCONTINUED | OUTPATIENT
Start: 2025-09-03 | End: 2025-09-06

## 2025-09-03 RX ORDER — LETROZOLE 2.5 MG/1
2.5 TABLET, FILM COATED ORAL DAILY
Refills: 0 | Status: DISCONTINUED | OUTPATIENT
Start: 2025-09-03 | End: 2025-09-06

## 2025-09-03 RX ORDER — PIPERACILLIN-TAZO-DEXTROSE,ISO 3.375G/5
3.38 IV SOLUTION, PIGGYBACK PREMIX FROZEN(ML) INTRAVENOUS ONCE
Refills: 0 | Status: COMPLETED | OUTPATIENT
Start: 2025-09-03 | End: 2025-09-03

## 2025-09-03 RX ORDER — INSULIN LISPRO 100 U/ML
INJECTION, SOLUTION INTRAVENOUS; SUBCUTANEOUS
Refills: 0 | Status: DISCONTINUED | OUTPATIENT
Start: 2025-09-03 | End: 2025-09-06

## 2025-09-03 RX ORDER — ONDANSETRON HCL/PF 4 MG/2 ML
4 VIAL (ML) INJECTION EVERY 6 HOURS
Refills: 0 | Status: DISCONTINUED | OUTPATIENT
Start: 2025-09-03 | End: 2025-09-06

## 2025-09-03 RX ORDER — ACETAMINOPHEN 500 MG/5ML
1000 LIQUID (ML) ORAL ONCE
Refills: 0 | Status: COMPLETED | OUTPATIENT
Start: 2025-09-03 | End: 2025-09-03

## 2025-09-03 RX ORDER — PIPERACILLIN-TAZO-DEXTROSE,ISO 3.375G/5
3.38 IV SOLUTION, PIGGYBACK PREMIX FROZEN(ML) INTRAVENOUS EVERY 8 HOURS
Refills: 0 | Status: DISCONTINUED | OUTPATIENT
Start: 2025-09-04 | End: 2025-09-06

## 2025-09-03 RX ORDER — GLUCAGON 3 MG/1
1 POWDER NASAL ONCE
Refills: 0 | Status: DISCONTINUED | OUTPATIENT
Start: 2025-09-03 | End: 2025-09-06

## 2025-09-03 RX ORDER — LEVOTHYROXINE SODIUM 300 MCG
150 TABLET ORAL DAILY
Refills: 0 | Status: DISCONTINUED | OUTPATIENT
Start: 2025-09-03 | End: 2025-09-06

## 2025-09-03 RX ADMIN — Medication 200 GRAM(S): at 22:06

## 2025-09-03 RX ADMIN — Medication 400 MILLIGRAM(S): at 17:30

## 2025-09-03 RX ADMIN — Medication 1000 MILLILITER(S): at 17:31

## 2025-09-03 RX ADMIN — Medication 4 MILLIGRAM(S): at 17:30

## 2025-09-04 ENCOUNTER — RESULT REVIEW (OUTPATIENT)
Age: 60
End: 2025-09-04

## 2025-09-04 LAB
A1C WITH ESTIMATED AVERAGE GLUCOSE RESULT: 5.4 % — SIGNIFICANT CHANGE UP (ref 4–5.6)
ALBUMIN SERPL ELPH-MCNC: 3 G/DL — LOW (ref 3.3–5)
ALP SERPL-CCNC: 43 U/L — SIGNIFICANT CHANGE UP (ref 40–120)
ALT FLD-CCNC: 25 U/L — SIGNIFICANT CHANGE UP (ref 12–78)
ANION GAP SERPL CALC-SCNC: 2 MMOL/L — LOW (ref 5–17)
AST SERPL-CCNC: 16 U/L — SIGNIFICANT CHANGE UP (ref 15–37)
BILIRUB SERPL-MCNC: 0.9 MG/DL — SIGNIFICANT CHANGE UP (ref 0.2–1.2)
BUN SERPL-MCNC: 12 MG/DL — SIGNIFICANT CHANGE UP (ref 7–23)
CALCIUM SERPL-MCNC: 8.4 MG/DL — LOW (ref 8.5–10.1)
CHLORIDE SERPL-SCNC: 108 MMOL/L — SIGNIFICANT CHANGE UP (ref 96–108)
CO2 SERPL-SCNC: 29 MMOL/L — SIGNIFICANT CHANGE UP (ref 22–31)
CREAT SERPL-MCNC: 0.81 MG/DL — SIGNIFICANT CHANGE UP (ref 0.5–1.3)
EGFR: 82 ML/MIN/1.73M2 — SIGNIFICANT CHANGE UP
EGFR: 82 ML/MIN/1.73M2 — SIGNIFICANT CHANGE UP
ESTIMATED AVERAGE GLUCOSE: 108 MG/DL — SIGNIFICANT CHANGE UP (ref 68–114)
GLUCOSE BLDC GLUCOMTR-MCNC: 131 MG/DL — HIGH (ref 70–99)
GLUCOSE BLDC GLUCOMTR-MCNC: 80 MG/DL — SIGNIFICANT CHANGE UP (ref 70–99)
GLUCOSE BLDC GLUCOMTR-MCNC: 87 MG/DL — SIGNIFICANT CHANGE UP (ref 70–99)
GLUCOSE BLDC GLUCOMTR-MCNC: 98 MG/DL — SIGNIFICANT CHANGE UP (ref 70–99)
GLUCOSE SERPL-MCNC: 90 MG/DL — SIGNIFICANT CHANGE UP (ref 70–99)
HCT VFR BLD CALC: 38.5 % — SIGNIFICANT CHANGE UP (ref 34.5–45)
HGB BLD-MCNC: 12.2 G/DL — SIGNIFICANT CHANGE UP (ref 11.5–15.5)
MAGNESIUM SERPL-MCNC: 2 MG/DL — SIGNIFICANT CHANGE UP (ref 1.6–2.6)
MCHC RBC-ENTMCNC: 26.2 PG — LOW (ref 27–34)
MCHC RBC-ENTMCNC: 31.7 G/DL — LOW (ref 32–36)
MCV RBC AUTO: 82.6 FL — SIGNIFICANT CHANGE UP (ref 80–100)
NRBC # BLD AUTO: 0 K/UL — SIGNIFICANT CHANGE UP (ref 0–0)
NRBC # FLD: 0 K/UL — SIGNIFICANT CHANGE UP (ref 0–0)
NRBC BLD AUTO-RTO: 0 /100 WBCS — SIGNIFICANT CHANGE UP (ref 0–0)
PHOSPHATE SERPL-MCNC: 2.8 MG/DL — SIGNIFICANT CHANGE UP (ref 2.5–4.5)
PLATELET # BLD AUTO: 240 K/UL — SIGNIFICANT CHANGE UP (ref 150–400)
PMV BLD: 10.2 FL — SIGNIFICANT CHANGE UP (ref 7–13)
POTASSIUM SERPL-MCNC: 3.5 MMOL/L — SIGNIFICANT CHANGE UP (ref 3.5–5.3)
POTASSIUM SERPL-SCNC: 3.5 MMOL/L — SIGNIFICANT CHANGE UP (ref 3.5–5.3)
PROT SERPL-MCNC: 6.6 GM/DL — SIGNIFICANT CHANGE UP (ref 6–8.3)
RBC # BLD: 4.66 M/UL — SIGNIFICANT CHANGE UP (ref 3.8–5.2)
RBC # FLD: 14.8 % — HIGH (ref 10.3–14.5)
SODIUM SERPL-SCNC: 139 MMOL/L — SIGNIFICANT CHANGE UP (ref 135–145)
WBC # BLD: 6.8 K/UL — SIGNIFICANT CHANGE UP (ref 3.8–10.5)
WBC # FLD AUTO: 6.8 K/UL — SIGNIFICANT CHANGE UP (ref 3.8–10.5)

## 2025-09-04 PROCEDURE — 93016 CV STRESS TEST SUPVJ ONLY: CPT

## 2025-09-04 PROCEDURE — 93306 TTE W/DOPPLER COMPLETE: CPT | Mod: 26

## 2025-09-04 PROCEDURE — 93018 CV STRESS TEST I&R ONLY: CPT

## 2025-09-04 PROCEDURE — 99222 1ST HOSP IP/OBS MODERATE 55: CPT

## 2025-09-04 RX ORDER — TIRZEPATIDE 7.5 MG/.5ML
15 INJECTION, SOLUTION SUBCUTANEOUS
Refills: 0 | DISCHARGE

## 2025-09-04 RX ORDER — HEPARIN SODIUM 1000 [USP'U]/ML
5000 INJECTION INTRAVENOUS; SUBCUTANEOUS EVERY 8 HOURS
Refills: 0 | Status: DISCONTINUED | OUTPATIENT
Start: 2025-09-04 | End: 2025-09-05

## 2025-09-04 RX ORDER — LEVOTHYROXINE SODIUM 300 MCG
1 TABLET ORAL
Refills: 0 | DISCHARGE

## 2025-09-04 RX ORDER — EVOLOCUMAB 140 MG/ML
140 INJECTION, SOLUTION SUBCUTANEOUS
Refills: 0 | DISCHARGE

## 2025-09-04 RX ORDER — INFLUENZA A VIRUS A/IDAHO/07/2018 (H1N1) ANTIGEN (MDCK CELL DERIVED, PROPIOLACTONE INACTIVATED, INFLUENZA A VIRUS A/INDIANA/08/2018 (H3N2) ANTIGEN (MDCK CELL DERIVED, PROPIOLACTONE INACTIVATED), INFLUENZA B VIRUS B/SINGAPORE/INFTT-16-0610/2016 ANTIGEN (MDCK CELL DERIVED, PROPIOLACTONE INACTIVATED), INFLUENZA B VIRUS B/IOWA/06/2017 ANTIGEN (MDCK CELL DERIVED, PROPIOLACTONE INACTIVATED) 15; 15; 15; 15 UG/.5ML; UG/.5ML; UG/.5ML; UG/.5ML
0.5 INJECTION, SUSPENSION INTRAMUSCULAR ONCE
Refills: 0 | Status: DISCONTINUED | OUTPATIENT
Start: 2025-09-04 | End: 2025-09-06

## 2025-09-04 RX ADMIN — Medication 150 MICROGRAM(S): at 05:09

## 2025-09-04 RX ADMIN — LETROZOLE 2.5 MILLIGRAM(S): 2.5 TABLET, FILM COATED ORAL at 13:51

## 2025-09-04 RX ADMIN — Medication 25 GRAM(S): at 13:52

## 2025-09-04 RX ADMIN — HEPARIN SODIUM 5000 UNIT(S): 1000 INJECTION INTRAVENOUS; SUBCUTANEOUS at 20:54

## 2025-09-04 RX ADMIN — HEPARIN SODIUM 5000 UNIT(S): 1000 INJECTION INTRAVENOUS; SUBCUTANEOUS at 13:51

## 2025-09-04 RX ADMIN — Medication 25 GRAM(S): at 01:26

## 2025-09-04 RX ADMIN — Medication 25 GRAM(S): at 20:53

## 2025-09-04 RX ADMIN — Medication 75 MILLILITER(S): at 21:04

## 2025-09-04 RX ADMIN — Medication 180 MILLIGRAM(S): at 13:51

## 2025-09-04 RX ADMIN — HEPARIN SODIUM 5000 UNIT(S): 1000 INJECTION INTRAVENOUS; SUBCUTANEOUS at 05:09

## 2025-09-04 RX ADMIN — SODIUM CHLORIDE 75 MILLILITER(S): 9 INJECTION, SOLUTION INTRAVENOUS at 00:22

## 2025-09-04 RX ADMIN — Medication 75 MILLILITER(S): at 01:26

## 2025-09-05 ENCOUNTER — RESULT REVIEW (OUTPATIENT)
Age: 60
End: 2025-09-05

## 2025-09-05 DIAGNOSIS — K81.0 ACUTE CHOLECYSTITIS: ICD-10-CM

## 2025-09-05 LAB
ADD ON TEST-SPECIMEN IN LAB: SIGNIFICANT CHANGE UP
ALBUMIN SERPL ELPH-MCNC: 3 G/DL — LOW (ref 3.3–5)
ALP SERPL-CCNC: 39 U/L — LOW (ref 40–120)
ALT FLD-CCNC: 25 U/L — SIGNIFICANT CHANGE UP (ref 12–78)
ANION GAP SERPL CALC-SCNC: 2 MMOL/L — LOW (ref 5–17)
APTT BLD: 25.6 SEC — LOW (ref 26.1–36.8)
AST SERPL-CCNC: 17 U/L — SIGNIFICANT CHANGE UP (ref 15–37)
BILIRUB DIRECT SERPL-MCNC: <0.1 MG/DL — SIGNIFICANT CHANGE UP (ref 0–0.3)
BILIRUB INDIRECT FLD-MCNC: >0.5 MG/DL — SIGNIFICANT CHANGE UP (ref 0.2–1)
BILIRUB SERPL-MCNC: 0.6 MG/DL — SIGNIFICANT CHANGE UP (ref 0.2–1.2)
BUN SERPL-MCNC: 8 MG/DL — SIGNIFICANT CHANGE UP (ref 7–23)
CALCIUM SERPL-MCNC: 8.2 MG/DL — LOW (ref 8.5–10.1)
CHLORIDE SERPL-SCNC: 111 MMOL/L — HIGH (ref 96–108)
CO2 SERPL-SCNC: 29 MMOL/L — SIGNIFICANT CHANGE UP (ref 22–31)
CREAT SERPL-MCNC: 0.7 MG/DL — SIGNIFICANT CHANGE UP (ref 0.5–1.3)
EGFR: 98 ML/MIN/1.73M2 — SIGNIFICANT CHANGE UP
EGFR: 98 ML/MIN/1.73M2 — SIGNIFICANT CHANGE UP
GLUCOSE BLDC GLUCOMTR-MCNC: 134 MG/DL — HIGH (ref 70–99)
GLUCOSE BLDC GLUCOMTR-MCNC: 85 MG/DL — SIGNIFICANT CHANGE UP (ref 70–99)
GLUCOSE BLDC GLUCOMTR-MCNC: 96 MG/DL — SIGNIFICANT CHANGE UP (ref 70–99)
GLUCOSE SERPL-MCNC: 88 MG/DL — SIGNIFICANT CHANGE UP (ref 70–99)
HCT VFR BLD CALC: 38 % — SIGNIFICANT CHANGE UP (ref 34.5–45)
HGB BLD-MCNC: 11.9 G/DL — SIGNIFICANT CHANGE UP (ref 11.5–15.5)
INR BLD: 1 RATIO — SIGNIFICANT CHANGE UP (ref 0.85–1.16)
MAGNESIUM SERPL-MCNC: 1.8 MG/DL — SIGNIFICANT CHANGE UP (ref 1.6–2.6)
MCHC RBC-ENTMCNC: 26.2 PG — LOW (ref 27–34)
MCHC RBC-ENTMCNC: 31.3 G/DL — LOW (ref 32–36)
MCV RBC AUTO: 83.7 FL — SIGNIFICANT CHANGE UP (ref 80–100)
NRBC # BLD AUTO: 0 K/UL — SIGNIFICANT CHANGE UP (ref 0–0)
NRBC # FLD: 0 K/UL — SIGNIFICANT CHANGE UP (ref 0–0)
NRBC BLD AUTO-RTO: 0 /100 WBCS — SIGNIFICANT CHANGE UP (ref 0–0)
PHOSPHATE SERPL-MCNC: 2.6 MG/DL — SIGNIFICANT CHANGE UP (ref 2.5–4.5)
PLATELET # BLD AUTO: 229 K/UL — SIGNIFICANT CHANGE UP (ref 150–400)
PMV BLD: 10.4 FL — SIGNIFICANT CHANGE UP (ref 7–13)
POTASSIUM SERPL-MCNC: 3.6 MMOL/L — SIGNIFICANT CHANGE UP (ref 3.5–5.3)
POTASSIUM SERPL-SCNC: 3.6 MMOL/L — SIGNIFICANT CHANGE UP (ref 3.5–5.3)
PROT SERPL-MCNC: 6.3 GM/DL — SIGNIFICANT CHANGE UP (ref 6–8.3)
PROTHROM AB SERPL-ACNC: 11.6 SEC — SIGNIFICANT CHANGE UP (ref 9.9–13.4)
RBC # BLD: 4.54 M/UL — SIGNIFICANT CHANGE UP (ref 3.8–5.2)
RBC # FLD: 14.8 % — HIGH (ref 10.3–14.5)
SODIUM SERPL-SCNC: 142 MMOL/L — SIGNIFICANT CHANGE UP (ref 135–145)
WBC # BLD: 5.11 K/UL — SIGNIFICANT CHANGE UP (ref 3.8–10.5)
WBC # FLD AUTO: 5.11 K/UL — SIGNIFICANT CHANGE UP (ref 3.8–10.5)

## 2025-09-05 PROCEDURE — 99232 SBSQ HOSP IP/OBS MODERATE 35: CPT

## 2025-09-05 PROCEDURE — 88304 TISSUE EXAM BY PATHOLOGIST: CPT | Mod: 26

## 2025-09-05 RX ORDER — BUPIVACAINE 13.3 MG/ML
20 INJECTION, SUSPENSION, LIPOSOMAL INFILTRATION ONCE
Refills: 0 | Status: DISCONTINUED | OUTPATIENT
Start: 2025-09-05 | End: 2025-09-06

## 2025-09-05 RX ORDER — OXYCODONE HYDROCHLORIDE 30 MG/1
5 TABLET ORAL ONCE
Refills: 0 | Status: DISCONTINUED | OUTPATIENT
Start: 2025-09-05 | End: 2025-09-05

## 2025-09-05 RX ORDER — OXYCODONE HYDROCHLORIDE AND ACETAMINOPHEN 10; 325 MG/1; MG/1
1 TABLET ORAL
Qty: 20 | Refills: 0
Start: 2025-09-05 | End: 2025-09-09

## 2025-09-05 RX ORDER — SODIUM CHLORIDE 9 G/1000ML
1000 INJECTION, SOLUTION INTRAVENOUS
Refills: 0 | Status: DISCONTINUED | OUTPATIENT
Start: 2025-09-05 | End: 2025-09-05

## 2025-09-05 RX ORDER — ONDANSETRON HCL/PF 4 MG/2 ML
4 VIAL (ML) INJECTION ONCE
Refills: 0 | Status: DISCONTINUED | OUTPATIENT
Start: 2025-09-05 | End: 2025-09-05

## 2025-09-05 RX ORDER — FENTANYL CITRATE-0.9 % NACL/PF 100MCG/2ML
25 SYRINGE (ML) INTRAVENOUS
Refills: 0 | Status: DISCONTINUED | OUTPATIENT
Start: 2025-09-05 | End: 2025-09-05

## 2025-09-05 RX ORDER — FENTANYL CITRATE-0.9 % NACL/PF 100MCG/2ML
50 SYRINGE (ML) INTRAVENOUS
Refills: 0 | Status: DISCONTINUED | OUTPATIENT
Start: 2025-09-05 | End: 2025-09-05

## 2025-09-05 RX ADMIN — Medication 180 MILLIGRAM(S): at 10:48

## 2025-09-05 RX ADMIN — Medication 25 GRAM(S): at 13:32

## 2025-09-05 RX ADMIN — Medication 50 MICROGRAM(S): at 18:53

## 2025-09-05 RX ADMIN — Medication 25 GRAM(S): at 21:33

## 2025-09-05 RX ADMIN — Medication 100 MILLILITER(S): at 21:34

## 2025-09-05 RX ADMIN — Medication 50 MICROGRAM(S): at 18:35

## 2025-09-05 RX ADMIN — Medication 150 MICROGRAM(S): at 05:27

## 2025-09-05 RX ADMIN — Medication 25 GRAM(S): at 05:27

## 2025-09-05 RX ADMIN — Medication 100 MILLILITER(S): at 05:31

## 2025-09-06 ENCOUNTER — TRANSCRIPTION ENCOUNTER (OUTPATIENT)
Age: 60
End: 2025-09-06

## 2025-09-06 VITALS
TEMPERATURE: 98 F | RESPIRATION RATE: 17 BRPM | OXYGEN SATURATION: 94 % | SYSTOLIC BLOOD PRESSURE: 114 MMHG | HEART RATE: 83 BPM | DIASTOLIC BLOOD PRESSURE: 72 MMHG

## 2025-09-06 LAB
ADD ON TEST-SPECIMEN IN LAB: SIGNIFICANT CHANGE UP
ALBUMIN SERPL ELPH-MCNC: 2.9 G/DL — LOW (ref 3.3–5)
ALP SERPL-CCNC: 46 U/L — SIGNIFICANT CHANGE UP (ref 40–120)
ALT FLD-CCNC: 52 U/L — SIGNIFICANT CHANGE UP (ref 12–78)
ANION GAP SERPL CALC-SCNC: 2 MMOL/L — LOW (ref 5–17)
AST SERPL-CCNC: 43 U/L — HIGH (ref 15–37)
BILIRUB DIRECT SERPL-MCNC: <0.1 MG/DL — SIGNIFICANT CHANGE UP (ref 0–0.3)
BILIRUB INDIRECT FLD-MCNC: >0.2 MG/DL — SIGNIFICANT CHANGE UP (ref 0.2–1)
BILIRUB SERPL-MCNC: 0.3 MG/DL — SIGNIFICANT CHANGE UP (ref 0.2–1.2)
BUN SERPL-MCNC: 11 MG/DL — SIGNIFICANT CHANGE UP (ref 7–23)
CALCIUM SERPL-MCNC: 8.1 MG/DL — LOW (ref 8.5–10.1)
CHLORIDE SERPL-SCNC: 112 MMOL/L — HIGH (ref 96–108)
CO2 SERPL-SCNC: 26 MMOL/L — SIGNIFICANT CHANGE UP (ref 22–31)
CREAT SERPL-MCNC: 0.81 MG/DL — SIGNIFICANT CHANGE UP (ref 0.5–1.3)
EGFR: 82 ML/MIN/1.73M2 — SIGNIFICANT CHANGE UP
EGFR: 82 ML/MIN/1.73M2 — SIGNIFICANT CHANGE UP
GLUCOSE BLDC GLUCOMTR-MCNC: 140 MG/DL — HIGH (ref 70–99)
GLUCOSE SERPL-MCNC: 131 MG/DL — HIGH (ref 70–99)
HCT VFR BLD CALC: 38.4 % — SIGNIFICANT CHANGE UP (ref 34.5–45)
HGB BLD-MCNC: 12.1 G/DL — SIGNIFICANT CHANGE UP (ref 11.5–15.5)
MCHC RBC-ENTMCNC: 26.4 PG — LOW (ref 27–34)
MCHC RBC-ENTMCNC: 31.5 G/DL — LOW (ref 32–36)
MCV RBC AUTO: 83.7 FL — SIGNIFICANT CHANGE UP (ref 80–100)
NRBC # BLD AUTO: 0 K/UL — SIGNIFICANT CHANGE UP (ref 0–0)
NRBC # FLD: 0 K/UL — SIGNIFICANT CHANGE UP (ref 0–0)
NRBC BLD AUTO-RTO: 0 /100 WBCS — SIGNIFICANT CHANGE UP (ref 0–0)
PLATELET # BLD AUTO: 258 K/UL — SIGNIFICANT CHANGE UP (ref 150–400)
PMV BLD: 10.2 FL — SIGNIFICANT CHANGE UP (ref 7–13)
POTASSIUM SERPL-MCNC: 4 MMOL/L — SIGNIFICANT CHANGE UP (ref 3.5–5.3)
POTASSIUM SERPL-SCNC: 4 MMOL/L — SIGNIFICANT CHANGE UP (ref 3.5–5.3)
PROT SERPL-MCNC: 6.6 GM/DL — SIGNIFICANT CHANGE UP (ref 6–8.3)
RBC # BLD: 4.59 M/UL — SIGNIFICANT CHANGE UP (ref 3.8–5.2)
RBC # FLD: 14.6 % — HIGH (ref 10.3–14.5)
SODIUM SERPL-SCNC: 140 MMOL/L — SIGNIFICANT CHANGE UP (ref 135–145)
WBC # BLD: 10.5 K/UL — SIGNIFICANT CHANGE UP (ref 3.8–10.5)
WBC # FLD AUTO: 10.5 K/UL — SIGNIFICANT CHANGE UP (ref 3.8–10.5)

## 2025-09-06 PROCEDURE — 99232 SBSQ HOSP IP/OBS MODERATE 35: CPT

## 2025-09-06 RX ADMIN — Medication 180 MILLIGRAM(S): at 09:50

## 2025-09-06 RX ADMIN — Medication 25 GRAM(S): at 05:27

## 2025-09-06 RX ADMIN — Medication 150 MICROGRAM(S): at 05:27

## 2025-09-06 RX ADMIN — LETROZOLE 2.5 MILLIGRAM(S): 2.5 TABLET, FILM COATED ORAL at 09:50

## 2025-09-06 RX ADMIN — Medication 100 MILLIGRAM(S): at 09:50

## 2025-09-09 LAB
CULTURE RESULTS: SIGNIFICANT CHANGE UP
CULTURE RESULTS: SIGNIFICANT CHANGE UP
SPECIMEN SOURCE: SIGNIFICANT CHANGE UP
SPECIMEN SOURCE: SIGNIFICANT CHANGE UP

## 2025-09-10 LAB — SURGICAL PATHOLOGY STUDY: SIGNIFICANT CHANGE UP

## 2025-09-18 ENCOUNTER — APPOINTMENT (OUTPATIENT)
Facility: CLINIC | Age: 60
End: 2025-09-18

## 2025-09-18 VITALS
HEIGHT: 66 IN | SYSTOLIC BLOOD PRESSURE: 145 MMHG | OXYGEN SATURATION: 100 % | HEART RATE: 95 BPM | TEMPERATURE: 97.5 F | DIASTOLIC BLOOD PRESSURE: 95 MMHG

## 2025-09-18 DIAGNOSIS — Z85.3 PERSONAL HISTORY OF MALIGNANT NEOPLASM OF BREAST: ICD-10-CM

## 2025-09-18 DIAGNOSIS — L24.9 IRRITANT CONTACT DERMATITIS, UNSPECIFIED CAUSE: ICD-10-CM

## 2025-09-18 DIAGNOSIS — E66.9 METABOLIC SYNDROME: ICD-10-CM

## 2025-09-18 DIAGNOSIS — Z90.13 ACQUIRED ABSENCE OF BILATERAL BREASTS AND NIPPLES: ICD-10-CM

## 2025-09-18 DIAGNOSIS — Z48.815: ICD-10-CM

## 2025-09-18 DIAGNOSIS — E88.810 METABOLIC SYNDROME: ICD-10-CM
